# Patient Record
Sex: FEMALE | Race: OTHER | Employment: FULL TIME | URBAN - METROPOLITAN AREA
[De-identification: names, ages, dates, MRNs, and addresses within clinical notes are randomized per-mention and may not be internally consistent; named-entity substitution may affect disease eponyms.]

---

## 2019-02-26 ENCOUNTER — HOSPITAL ENCOUNTER (EMERGENCY)
Age: 44
Discharge: HOME | End: 2019-02-26
Attending: EMERGENCY MEDICINE
Payer: COMMERCIAL

## 2019-02-26 VITALS
TEMPERATURE: 98.1 F | RESPIRATION RATE: 20 BRPM | OXYGEN SATURATION: 97 % | SYSTOLIC BLOOD PRESSURE: 103 MMHG | DIASTOLIC BLOOD PRESSURE: 67 MMHG | HEART RATE: 71 BPM

## 2019-02-26 DIAGNOSIS — R11.2 NAUSEA VOMITING AND DIARRHEA: Primary | ICD-10-CM

## 2019-02-26 DIAGNOSIS — R19.7 NAUSEA VOMITING AND DIARRHEA: Primary | ICD-10-CM

## 2019-02-26 LAB
ALBUMIN SERPL-MCNC: 4.1 G/DL (ref 3.4–5)
ALP BLD-CCNC: 85 U/L (ref 40–129)
ALT SERPL-CCNC: 21 U/L (ref 10–40)
ANION GAP SERPL CALCULATED.3IONS-SCNC: 12 MMOL/L (ref 3–16)
AST SERPL-CCNC: 25 U/L (ref 15–37)
BASOPHILS ABSOLUTE: 0 K/UL (ref 0–0.2)
BASOPHILS RELATIVE PERCENT: 0.1 %
BILIRUB SERPL-MCNC: 0.5 MG/DL (ref 0–1)
BILIRUBIN DIRECT: <0.2 MG/DL (ref 0–0.3)
BILIRUBIN URINE: NEGATIVE
BILIRUBIN, INDIRECT: NORMAL MG/DL (ref 0–1)
BLOOD, URINE: NEGATIVE
BUN BLDV-MCNC: 9 MG/DL (ref 7–20)
CALCIUM SERPL-MCNC: 9 MG/DL (ref 8.3–10.6)
CHLORIDE BLD-SCNC: 106 MMOL/L (ref 99–110)
CLARITY: CLEAR
CO2: 20 MMOL/L (ref 21–32)
COLOR: YELLOW
CREAT SERPL-MCNC: 0.6 MG/DL (ref 0.6–1.1)
EOSINOPHILS ABSOLUTE: 0.1 K/UL (ref 0–0.6)
EOSINOPHILS RELATIVE PERCENT: 2.2 %
GFR AFRICAN AMERICAN: >60
GFR NON-AFRICAN AMERICAN: >60
GLUCOSE BLD-MCNC: 98 MG/DL (ref 70–99)
GLUCOSE URINE: NEGATIVE MG/DL
HCG(URINE) PREGNANCY TEST: NEGATIVE
HCT VFR BLD CALC: 38.9 % (ref 36–48)
HEMOGLOBIN: 13 G/DL (ref 12–16)
KETONES, URINE: NEGATIVE MG/DL
LEUKOCYTE ESTERASE, URINE: NEGATIVE
LIPASE: 36 U/L (ref 13–60)
LYMPHOCYTES ABSOLUTE: 0.8 K/UL (ref 1–5.1)
LYMPHOCYTES RELATIVE PERCENT: 16.2 %
MCH RBC QN AUTO: 27.6 PG (ref 26–34)
MCHC RBC AUTO-ENTMCNC: 33.5 G/DL (ref 31–36)
MCV RBC AUTO: 82.4 FL (ref 80–100)
MICROSCOPIC EXAMINATION: NORMAL
MONOCYTES ABSOLUTE: 0.7 K/UL (ref 0–1.3)
MONOCYTES RELATIVE PERCENT: 14 %
NEUTROPHILS ABSOLUTE: 3.2 K/UL (ref 1.7–7.7)
NEUTROPHILS RELATIVE PERCENT: 67.5 %
NITRITE, URINE: NEGATIVE
PDW BLD-RTO: 14.3 % (ref 12.4–15.4)
PH UA: 6
PLATELET # BLD: 225 K/UL (ref 135–450)
PMV BLD AUTO: 8 FL (ref 5–10.5)
POTASSIUM REFLEX MAGNESIUM: 4 MMOL/L (ref 3.5–5.1)
PROTEIN UA: NEGATIVE MG/DL
RBC # BLD: 4.72 M/UL (ref 4–5.2)
SODIUM BLD-SCNC: 138 MMOL/L (ref 136–145)
SPECIFIC GRAVITY UA: 1.02
TOTAL PROTEIN: 8.1 G/DL (ref 6.4–8.2)
URINE TYPE: NORMAL
UROBILINOGEN, URINE: 0.2 E.U./DL
WBC # BLD: 4.8 K/UL (ref 4–11)

## 2019-02-26 PROCEDURE — 80048 BASIC METABOLIC PNL TOTAL CA: CPT

## 2019-02-26 PROCEDURE — 84703 CHORIONIC GONADOTROPIN ASSAY: CPT

## 2019-02-26 PROCEDURE — 2580000003 HC RX 258: Performed by: STUDENT IN AN ORGANIZED HEALTH CARE EDUCATION/TRAINING PROGRAM

## 2019-02-26 PROCEDURE — 85025 COMPLETE CBC W/AUTO DIFF WBC: CPT

## 2019-02-26 PROCEDURE — 80076 HEPATIC FUNCTION PANEL: CPT

## 2019-02-26 PROCEDURE — 6360000002 HC RX W HCPCS: Performed by: STUDENT IN AN ORGANIZED HEALTH CARE EDUCATION/TRAINING PROGRAM

## 2019-02-26 PROCEDURE — 81003 URINALYSIS AUTO W/O SCOPE: CPT

## 2019-02-26 PROCEDURE — 99284 EMERGENCY DEPT VISIT MOD MDM: CPT

## 2019-02-26 PROCEDURE — 83690 ASSAY OF LIPASE: CPT

## 2019-02-26 PROCEDURE — 96375 TX/PRO/DX INJ NEW DRUG ADDON: CPT

## 2019-02-26 PROCEDURE — 96365 THER/PROPH/DIAG IV INF INIT: CPT

## 2019-02-26 RX ORDER — SODIUM CHLORIDE, SODIUM LACTATE, POTASSIUM CHLORIDE, CALCIUM CHLORIDE 600; 310; 30; 20 MG/100ML; MG/100ML; MG/100ML; MG/100ML
1000 INJECTION, SOLUTION INTRAVENOUS ONCE
Status: COMPLETED | OUTPATIENT
Start: 2019-02-26 | End: 2019-02-26

## 2019-02-26 RX ORDER — ONDANSETRON 2 MG/ML
8 INJECTION INTRAMUSCULAR; INTRAVENOUS ONCE
Status: COMPLETED | OUTPATIENT
Start: 2019-02-26 | End: 2019-02-26

## 2019-02-26 RX ORDER — ONDANSETRON 4 MG/1
8 TABLET, ORALLY DISINTEGRATING ORAL EVERY 8 HOURS PRN
Qty: 9 TABLET | Refills: 0 | Status: SHIPPED | OUTPATIENT
Start: 2019-02-26 | End: 2019-03-01

## 2019-02-26 RX ADMIN — SODIUM CHLORIDE, POTASSIUM CHLORIDE, SODIUM LACTATE AND CALCIUM CHLORIDE 1000 ML: 600; 310; 30; 20 INJECTION, SOLUTION INTRAVENOUS at 10:19

## 2019-02-26 RX ADMIN — ONDANSETRON 8 MG: 2 INJECTION INTRAMUSCULAR; INTRAVENOUS at 10:19

## 2019-02-26 ASSESSMENT — ENCOUNTER SYMPTOMS
EYE ITCHING: 0
COUGH: 0
ABDOMINAL PAIN: 0
NAUSEA: 1
CONSTIPATION: 0
RHINORRHEA: 0
DIARRHEA: 1
WHEEZING: 0
BLOOD IN STOOL: 0
SHORTNESS OF BREATH: 0
SORE THROAT: 0
VOMITING: 1
EYE DISCHARGE: 0

## 2019-02-26 ASSESSMENT — PAIN SCALES - GENERAL: PAINLEVEL_OUTOF10: 5

## 2019-02-26 ASSESSMENT — PAIN DESCRIPTION - DESCRIPTORS: DESCRIPTORS: HEADACHE

## 2019-02-26 ASSESSMENT — PAIN DESCRIPTION - FREQUENCY: FREQUENCY: CONTINUOUS

## 2019-04-14 ENCOUNTER — APPOINTMENT (OUTPATIENT)
Dept: CT IMAGING | Age: 44
DRG: 690 | End: 2019-04-14
Payer: COMMERCIAL

## 2019-04-14 ENCOUNTER — HOSPITAL ENCOUNTER (INPATIENT)
Age: 44
LOS: 2 days | Discharge: HOME | DRG: 690 | End: 2019-04-16
Attending: EMERGENCY MEDICINE | Admitting: HOSPITALIST
Payer: COMMERCIAL

## 2019-04-14 DIAGNOSIS — N10 ACUTE PYELONEPHRITIS: Primary | ICD-10-CM

## 2019-04-14 PROBLEM — N12 PYELONEPHRITIS: Status: ACTIVE | Noted: 2019-04-14

## 2019-04-14 LAB
A/G RATIO: 0.9 (ref 1.1–2.2)
ALBUMIN SERPL-MCNC: 3.8 G/DL (ref 3.4–5)
ALP BLD-CCNC: 88 U/L (ref 40–129)
ALT SERPL-CCNC: 25 U/L (ref 10–40)
ANION GAP SERPL CALCULATED.3IONS-SCNC: 10 MMOL/L (ref 3–16)
AST SERPL-CCNC: 26 U/L (ref 15–37)
BACTERIA: ABNORMAL /HPF
BASE EXCESS VENOUS: -3 (ref -3–3)
BASOPHILS ABSOLUTE: 0 K/UL (ref 0–0.2)
BASOPHILS RELATIVE PERCENT: 0.2 %
BILIRUB SERPL-MCNC: 0.5 MG/DL (ref 0–1)
BILIRUBIN URINE: NEGATIVE
BLOOD, URINE: ABNORMAL
BUN BLDV-MCNC: 9 MG/DL (ref 7–20)
CALCIUM SERPL-MCNC: 9.1 MG/DL (ref 8.3–10.6)
CHLORIDE BLD-SCNC: 101 MMOL/L (ref 99–110)
CLARITY: CLEAR
CO2: 23 MMOL/L (ref 21–32)
COLOR: YELLOW
CREAT SERPL-MCNC: 0.6 MG/DL (ref 0.6–1.1)
EOSINOPHILS ABSOLUTE: 0.1 K/UL (ref 0–0.6)
EOSINOPHILS RELATIVE PERCENT: 1.3 %
EPITHELIAL CELLS, UA: ABNORMAL /HPF
GFR AFRICAN AMERICAN: >60
GFR NON-AFRICAN AMERICAN: >60
GLOBULIN: 4.3 G/DL
GLUCOSE BLD-MCNC: 97 MG/DL (ref 70–99)
GLUCOSE URINE: NEGATIVE MG/DL
HCG(URINE) PREGNANCY TEST: NEGATIVE
HCO3 VENOUS: 21.9 MMOL/L (ref 23–29)
HCT VFR BLD CALC: 36.9 % (ref 36–48)
HEMOGLOBIN: 12 G/DL (ref 12–16)
KETONES, URINE: NEGATIVE MG/DL
LACTIC ACID, SEPSIS: 0.8 MMOL/L (ref 0.4–1.9)
LACTIC ACID, SEPSIS: 0.9 MMOL/L (ref 0.4–1.9)
LEUKOCYTE ESTERASE, URINE: ABNORMAL
LIPASE: 33 U/L (ref 13–60)
LYMPHOCYTES ABSOLUTE: 0.7 K/UL (ref 1–5.1)
LYMPHOCYTES RELATIVE PERCENT: 9.4 %
MCH RBC QN AUTO: 26.9 PG (ref 26–34)
MCHC RBC AUTO-ENTMCNC: 32.6 G/DL (ref 31–36)
MCV RBC AUTO: 82.7 FL (ref 80–100)
MICROSCOPIC EXAMINATION: YES
MONOCYTES ABSOLUTE: 0.8 K/UL (ref 0–1.3)
MONOCYTES RELATIVE PERCENT: 10.7 %
NEUTROPHILS ABSOLUTE: 5.8 K/UL (ref 1.7–7.7)
NEUTROPHILS RELATIVE PERCENT: 78.4 %
NITRITE, URINE: POSITIVE
O2 SAT, VEN: 38 %
PCO2, VEN: 34.8 MM HG (ref 40–50)
PDW BLD-RTO: 14.3 % (ref 12.4–15.4)
PERFORMED ON: ABNORMAL
PH UA: 7 (ref 5–8)
PH VENOUS: 7.41 (ref 7.35–7.45)
PLATELET # BLD: 222 K/UL (ref 135–450)
PMV BLD AUTO: 7.9 FL (ref 5–10.5)
PO2, VEN: 22 MM HG
POC SAMPLE TYPE: ABNORMAL
POTASSIUM REFLEX MAGNESIUM: 4.1 MMOL/L (ref 3.5–5.1)
PROTEIN UA: NEGATIVE MG/DL
RBC # BLD: 4.46 M/UL (ref 4–5.2)
RBC UA: ABNORMAL /HPF (ref 0–2)
SODIUM BLD-SCNC: 134 MMOL/L (ref 136–145)
SPECIFIC GRAVITY UA: 1.01 (ref 1–1.03)
TCO2 CALC VENOUS: 23 MMOL/L
TOTAL PROTEIN: 8.1 G/DL (ref 6.4–8.2)
URINE TYPE: ABNORMAL
UROBILINOGEN, URINE: 1 E.U./DL
WBC # BLD: 7.4 K/UL (ref 4–11)
WBC UA: ABNORMAL /HPF (ref 0–5)

## 2019-04-14 PROCEDURE — 6360000002 HC RX W HCPCS: Performed by: EMERGENCY MEDICINE

## 2019-04-14 PROCEDURE — 6370000000 HC RX 637 (ALT 250 FOR IP): Performed by: STUDENT IN AN ORGANIZED HEALTH CARE EDUCATION/TRAINING PROGRAM

## 2019-04-14 PROCEDURE — 87086 URINE CULTURE/COLONY COUNT: CPT

## 2019-04-14 PROCEDURE — 6370000000 HC RX 637 (ALT 250 FOR IP): Performed by: FAMILY MEDICINE

## 2019-04-14 PROCEDURE — 96375 TX/PRO/DX INJ NEW DRUG ADDON: CPT

## 2019-04-14 PROCEDURE — 82803 BLOOD GASES ANY COMBINATION: CPT

## 2019-04-14 PROCEDURE — 85025 COMPLETE CBC W/AUTO DIFF WBC: CPT

## 2019-04-14 PROCEDURE — 2580000003 HC RX 258: Performed by: EMERGENCY MEDICINE

## 2019-04-14 PROCEDURE — 6360000002 HC RX W HCPCS: Performed by: STUDENT IN AN ORGANIZED HEALTH CARE EDUCATION/TRAINING PROGRAM

## 2019-04-14 PROCEDURE — 87077 CULTURE AEROBIC IDENTIFY: CPT

## 2019-04-14 PROCEDURE — 74177 CT ABD & PELVIS W/CONTRAST: CPT

## 2019-04-14 PROCEDURE — 2580000003 HC RX 258: Performed by: HOSPITALIST

## 2019-04-14 PROCEDURE — G0378 HOSPITAL OBSERVATION PER HR: HCPCS

## 2019-04-14 PROCEDURE — 96372 THER/PROPH/DIAG INJ SC/IM: CPT

## 2019-04-14 PROCEDURE — 1200000000 HC SEMI PRIVATE

## 2019-04-14 PROCEDURE — 6360000002 HC RX W HCPCS: Performed by: HOSPITALIST

## 2019-04-14 PROCEDURE — 99285 EMERGENCY DEPT VISIT HI MDM: CPT

## 2019-04-14 PROCEDURE — 83605 ASSAY OF LACTIC ACID: CPT

## 2019-04-14 PROCEDURE — 83690 ASSAY OF LIPASE: CPT

## 2019-04-14 PROCEDURE — 87040 BLOOD CULTURE FOR BACTERIA: CPT

## 2019-04-14 PROCEDURE — 87186 SC STD MICRODIL/AGAR DIL: CPT

## 2019-04-14 PROCEDURE — C9113 INJ PANTOPRAZOLE SODIUM, VIA: HCPCS | Performed by: HOSPITALIST

## 2019-04-14 PROCEDURE — 96374 THER/PROPH/DIAG INJ IV PUSH: CPT

## 2019-04-14 PROCEDURE — 36415 COLL VENOUS BLD VENIPUNCTURE: CPT

## 2019-04-14 PROCEDURE — 80053 COMPREHEN METABOLIC PANEL: CPT

## 2019-04-14 PROCEDURE — 81001 URINALYSIS AUTO W/SCOPE: CPT

## 2019-04-14 PROCEDURE — 2580000003 HC RX 258: Performed by: STUDENT IN AN ORGANIZED HEALTH CARE EDUCATION/TRAINING PROGRAM

## 2019-04-14 PROCEDURE — 6360000004 HC RX CONTRAST MEDICATION: Performed by: EMERGENCY MEDICINE

## 2019-04-14 PROCEDURE — 84703 CHORIONIC GONADOTROPIN ASSAY: CPT

## 2019-04-14 RX ORDER — ACETAMINOPHEN 325 MG/1
650 TABLET ORAL ONCE
Status: COMPLETED | OUTPATIENT
Start: 2019-04-14 | End: 2019-04-14

## 2019-04-14 RX ORDER — POTASSIUM CHLORIDE 20 MEQ/1
40 TABLET, EXTENDED RELEASE ORAL PRN
Status: DISCONTINUED | OUTPATIENT
Start: 2019-04-14 | End: 2019-04-16 | Stop reason: HOSPADM

## 2019-04-14 RX ORDER — KETOROLAC TROMETHAMINE 30 MG/ML
15 INJECTION, SOLUTION INTRAMUSCULAR; INTRAVENOUS ONCE
Status: COMPLETED | OUTPATIENT
Start: 2019-04-14 | End: 2019-04-14

## 2019-04-14 RX ORDER — SODIUM CHLORIDE 0.9 % (FLUSH) 0.9 %
10 SYRINGE (ML) INJECTION PRN
Status: DISCONTINUED | OUTPATIENT
Start: 2019-04-14 | End: 2019-04-16 | Stop reason: HOSPADM

## 2019-04-14 RX ORDER — ACETAMINOPHEN 325 MG/1
650 TABLET ORAL EVERY 6 HOURS PRN
Status: DISCONTINUED | OUTPATIENT
Start: 2019-04-14 | End: 2019-04-16 | Stop reason: HOSPADM

## 2019-04-14 RX ORDER — 0.9 % SODIUM CHLORIDE 0.9 %
30 INTRAVENOUS SOLUTION INTRAVENOUS ONCE
Status: COMPLETED | OUTPATIENT
Start: 2019-04-14 | End: 2019-04-14

## 2019-04-14 RX ORDER — MORPHINE SULFATE 2 MG/ML
2 INJECTION, SOLUTION INTRAMUSCULAR; INTRAVENOUS EVERY 4 HOURS PRN
Status: DISCONTINUED | OUTPATIENT
Start: 2019-04-14 | End: 2019-04-16 | Stop reason: HOSPADM

## 2019-04-14 RX ORDER — SODIUM CHLORIDE 0.9 % (FLUSH) 0.9 %
10 SYRINGE (ML) INJECTION EVERY 12 HOURS SCHEDULED
Status: DISCONTINUED | OUTPATIENT
Start: 2019-04-14 | End: 2019-04-16 | Stop reason: HOSPADM

## 2019-04-14 RX ORDER — SODIUM CHLORIDE 0.9 % (FLUSH) 0.9 %
10 SYRINGE (ML) INJECTION PRN
Status: DISCONTINUED | OUTPATIENT
Start: 2019-04-14 | End: 2019-04-14 | Stop reason: SDUPTHER

## 2019-04-14 RX ORDER — POTASSIUM CHLORIDE 7.45 MG/ML
10 INJECTION INTRAVENOUS PRN
Status: DISCONTINUED | OUTPATIENT
Start: 2019-04-14 | End: 2019-04-16 | Stop reason: HOSPADM

## 2019-04-14 RX ORDER — SODIUM CHLORIDE 9 MG/ML
INJECTION, SOLUTION INTRAVENOUS CONTINUOUS
Status: DISCONTINUED | OUTPATIENT
Start: 2019-04-14 | End: 2019-04-16 | Stop reason: HOSPADM

## 2019-04-14 RX ORDER — SODIUM CHLORIDE 0.9 % (FLUSH) 0.9 %
10 SYRINGE (ML) INJECTION EVERY 12 HOURS SCHEDULED
Status: DISCONTINUED | OUTPATIENT
Start: 2019-04-14 | End: 2019-04-14 | Stop reason: SDUPTHER

## 2019-04-14 RX ORDER — PANTOPRAZOLE SODIUM 40 MG/10ML
40 INJECTION, POWDER, LYOPHILIZED, FOR SOLUTION INTRAVENOUS DAILY
Status: DISCONTINUED | OUTPATIENT
Start: 2019-04-14 | End: 2019-04-16 | Stop reason: HOSPADM

## 2019-04-14 RX ORDER — ONDANSETRON 2 MG/ML
4 INJECTION INTRAMUSCULAR; INTRAVENOUS EVERY 6 HOURS PRN
Status: DISCONTINUED | OUTPATIENT
Start: 2019-04-14 | End: 2019-04-16 | Stop reason: HOSPADM

## 2019-04-14 RX ORDER — ACETAMINOPHEN 650 MG/1
650 SUPPOSITORY RECTAL EVERY 4 HOURS PRN
Status: DISCONTINUED | OUTPATIENT
Start: 2019-04-14 | End: 2019-04-16 | Stop reason: HOSPADM

## 2019-04-14 RX ADMIN — IOPAMIDOL 80 ML: 755 INJECTION, SOLUTION INTRAVENOUS at 16:11

## 2019-04-14 RX ADMIN — CEFTRIAXONE 1 G: 1 INJECTION, POWDER, FOR SOLUTION INTRAMUSCULAR; INTRAVENOUS at 16:31

## 2019-04-14 RX ADMIN — ENOXAPARIN SODIUM 40 MG: 40 INJECTION SUBCUTANEOUS at 21:31

## 2019-04-14 RX ADMIN — SODIUM CHLORIDE 2217 ML: 9 INJECTION, SOLUTION INTRAVENOUS at 15:12

## 2019-04-14 RX ADMIN — Medication 10 ML: at 21:31

## 2019-04-14 RX ADMIN — KETOROLAC TROMETHAMINE 15 MG: 30 INJECTION, SOLUTION INTRAMUSCULAR at 15:36

## 2019-04-14 RX ADMIN — PANTOPRAZOLE SODIUM 40 MG: 40 INJECTION, POWDER, FOR SOLUTION INTRAVENOUS at 21:31

## 2019-04-14 RX ADMIN — ACETAMINOPHEN 650 MG: 325 TABLET ORAL at 16:31

## 2019-04-14 RX ADMIN — ACETAMINOPHEN 650 MG: 325 TABLET ORAL at 21:31

## 2019-04-14 RX ADMIN — SODIUM CHLORIDE: 900 INJECTION, SOLUTION INTRAVENOUS at 19:26

## 2019-04-14 ASSESSMENT — PAIN DESCRIPTION - ONSET: ONSET: ON-GOING

## 2019-04-14 ASSESSMENT — ENCOUNTER SYMPTOMS
EYE PAIN: 0
VOMITING: 0
COUGH: 0
TROUBLE SWALLOWING: 0
DIARRHEA: 0
ABDOMINAL PAIN: 0
SHORTNESS OF BREATH: 0
BLOOD IN STOOL: 0
NAUSEA: 0

## 2019-04-14 ASSESSMENT — PAIN - FUNCTIONAL ASSESSMENT: PAIN_FUNCTIONAL_ASSESSMENT: ACTIVITIES ARE NOT PREVENTED

## 2019-04-14 ASSESSMENT — PAIN DESCRIPTION - FREQUENCY: FREQUENCY: INTERMITTENT

## 2019-04-14 ASSESSMENT — PAIN DESCRIPTION - PAIN TYPE: TYPE: ACUTE PAIN

## 2019-04-14 ASSESSMENT — PAIN DESCRIPTION - LOCATION: LOCATION: FLANK

## 2019-04-14 ASSESSMENT — PAIN DESCRIPTION - PROGRESSION: CLINICAL_PROGRESSION: NOT CHANGED

## 2019-04-14 ASSESSMENT — PAIN SCALES - GENERAL
PAINLEVEL_OUTOF10: 0
PAINLEVEL_OUTOF10: 6
PAINLEVEL_OUTOF10: 7
PAINLEVEL_OUTOF10: 7

## 2019-04-14 ASSESSMENT — PAIN DESCRIPTION - DESCRIPTORS: DESCRIPTORS: SHARP

## 2019-04-14 ASSESSMENT — PAIN DESCRIPTION - ORIENTATION: ORIENTATION: RIGHT

## 2019-04-14 NOTE — LETTER
[address] Erica Pretty[address] [facility] [address]  Phone: [phone]             April 16, 2019    Patient: Hans Tobin   YOB: 1975   Date of Visit: 4/14/2019       To Whom It May Concern:    Hans Tobin was seen and treated in our facility  beginning 4/14/2019 until 4/16/2019 . She may return to work on 4/18/2019. Emma George       Sincerely,       Barbie Guzman RN         Signature:__________________________________

## 2019-04-14 NOTE — LETTER
[address] [address] [place] [address]  Phone: [phone]             April 16, 2019    Patient: Corrinne Brakeman   YOB: 1975   Date of Visit: 4/14/2019       To Whom It May Concern:    Corrinne Brakeman was seen and treated in our facility  beginning 4/14/2019 until 4/16/2019. She may return to work on 4/18/2019.       Sincerely,       Milind Vogel RN         Signature:__________________________________

## 2019-04-14 NOTE — ED NOTES
[address] [facility] RESIDENT NOTE       Date of evaluation: 4/14/2019    Chief Complaint     Fever and Back Pain      History of Present Illness     Wei Nice is a 44 y.o. female who presents with 3 days of fever and right flank pain. Patient states she has been having fevers as high as 102 for the past 2 days. She also endorses right sided flank pain. Patient also complained of very mild neck pain and headache. She denies nausea, vomiting. She does endorse some dysuria over the same time period, however denies hematuria. History of nephrolithiasis and GERD, otherwise no pertinent medical or surgical history. Review of Systems     Review of Systems   Constitutional: Positive for chills and fever. HENT: Negative for trouble swallowing. Eyes: Negative for pain and visual disturbance. Respiratory: Negative for cough and shortness of breath. Cardiovascular: Negative for chest pain and leg swelling. Gastrointestinal: Negative for abdominal pain, blood in stool, diarrhea, nausea and vomiting. Genitourinary: Positive for dysuria and flank pain. Negative for difficulty urinating, menstrual problem and vaginal discharge. Musculoskeletal: Positive for neck pain. Negative for gait problem and neck stiffness. Skin: Negative for pallor, rash and wound. Allergic/Immunologic: Negative for immunocompromised state. Neurological: Negative for seizures and syncope. Hematological: Does not bruise/bleed easily. Psychiatric/Behavioral: Negative for agitation and behavioral problems. Past Medical, Surgical, Family, and Social History     She has a past medical history of Acid reflux and Gallstones. She has a past surgical history that includes Cholecystectomy. Her family history is not on file. She reports that she has never smoked. She has never used smokeless tobacco. She reports that she drinks alcohol.  She reports that she does not use drugs.    Medications     There are no discharge medications for this patient. Allergies     She has No Known Allergies. Physical Exam     INITIAL VITALS: BP: 121/82, Temp: 101 °F (38.3 °C), Pulse: 104, Resp: 16, SpO2: 100 %   Physical Exam   Constitutional: She appears well-developed and well-nourished. No distress. HENT:   Head: Normocephalic. Mouth/Throat: No oropharyngeal exudate. Eyes: Pupils are equal, round, and reactive to light. Right eye exhibits no discharge. Left eye exhibits no discharge. No scleral icterus. Neck: Normal range of motion. No JVD present. No tracheal deviation present. There is no evidence of meningismus. There is full range of motion without pain. There is no midline tenderness to palpation over the cervical spine. There are no signs of trauma otherwise. Cardiovascular: Regular rhythm. Exam reveals no friction rub. No murmur heard. Pulmonary/Chest: Effort normal. No stridor. No respiratory distress. She has no wheezes. She has no rales. Abdominal: Soft. She exhibits no distension. There is no tenderness. There is no rebound and no guarding. +right-sided flank pain   Musculoskeletal: Normal range of motion. She exhibits no edema. Neurological: No cranial nerve deficit. Skin: Skin is warm and dry. Capillary refill takes less than 2 seconds. She is not diaphoretic. Psychiatric: She has a normal mood and affect. Her behavior is normal.   Nursing note and vitals reviewed. DiagnosticResults     EKG       RADIOLOGY:  CT ABDOMEN PELVIS W IV CONTRAST Additional Contrast? None   Final Result      1. Abnormal right kidney with delayed nephrogram and a couple areas of ill-defined hypoattenuation in the parenchyma with mild surrounding inflammatory change. The findings are suspicious for pyelonephritis. Correlate clinically with physical exam and    urinalysis. 2. Punctate bilateral nephrolithiasis. 3. Normal appendix.              LABS:   Results for orders placed or performed during the hospital encounter of 04/14/19   Lactate, Sepsis   Result Value Ref Range    Lactic Acid, Sepsis 0.9 0.4 - 1.9 mmol/L   CBC auto differential   Result Value Ref Range    WBC 7.4 4.0 - 11.0 K/uL    RBC 4.46 4.00 - 5.20 M/uL    Hemoglobin 12.0 12.0 - 16.0 g/dL    Hematocrit 36.9 36.0 - 48.0 %    MCV 82.7 80.0 - 100.0 fL    MCH 26.9 26.0 - 34.0 pg    MCHC 32.6 31.0 - 36.0 g/dL    RDW 14.3 12.4 - 15.4 %    Platelets 362 980 - 248 K/uL    MPV 7.9 5.0 - 10.5 fL    Neutrophils % 78.4 %    Lymphocytes % 9.4 %    Monocytes % 10.7 %    Eosinophils % 1.3 %    Basophils % 0.2 %    Neutrophils # 5.8 1.7 - 7.7 K/uL    Lymphocytes # 0.7 (L) 1.0 - 5.1 K/uL    Monocytes # 0.8 0.0 - 1.3 K/uL    Eosinophils # 0.1 0.0 - 0.6 K/uL    Basophils # 0.0 0.0 - 0.2 K/uL   Comprehensive Metabolic Panel w/ Reflex to MG   Result Value Ref Range    Sodium 134 (L) 136 - 145 mmol/L    Potassium reflex Magnesium 4.1 3.5 - 5.1 mmol/L    Chloride 101 99 - 110 mmol/L    CO2 23 21 - 32 mmol/L    Anion Gap 10 3 - 16    Glucose 97 70 - 99 mg/dL    BUN 9 7 - 20 mg/dL    CREATININE 0.6 0.6 - 1.1 mg/dL    GFR Non-African American >60 >60    GFR African American >60 >60    Calcium 9.1 8.3 - 10.6 mg/dL    Total Protein 8.1 6.4 - 8.2 g/dL    Alb 3.8 3.4 - 5.0 g/dL    Albumin/Globulin Ratio 0.9 (L) 1.1 - 2.2    Total Bilirubin 0.5 0.0 - 1.0 mg/dL    Alkaline Phosphatase 88 40 - 129 U/L    ALT 25 10 - 40 U/L    AST 26 15 - 37 U/L    Globulin 4.3 g/dL   Urinalysis, reflex to microscopic   Result Value Ref Range    Color, UA Yellow Straw/Yellow    Clarity, UA Clear Clear    Glucose, Ur Negative Negative mg/dL    Bilirubin Urine Negative Negative    Ketones, Urine Negative Negative mg/dL    Specific Gravity, UA 1.015 1.005 - 1.030    Blood, Urine MODERATE (A) Negative    pH, UA 7.0 5.0 - 8.0    Protein, UA Negative Negative mg/dL    Urobilinogen, Urine 1.0 <2.0 E.U./dL    Nitrite, Urine POSITIVE (A) Negative    Leukocyte Esterase, Urine MODERATE (A) Negative    Microscopic Examination YES     Urine Type Voided    Pregnancy, Urine   Result Value Ref Range    HCG(Urine) Pregnancy Test Negative Detects HCG level >20 MIU/mL   Lipase   Result Value Ref Range    Lipase 33.0 13.0 - 60.0 U/L   Microscopic Urinalysis   Result Value Ref Range    WBC, UA 10-20 (A) 0 - 5 /HPF    RBC, UA 0-2 0 - 2 /HPF    Epi Cells 3-5 /HPF    Bacteria, UA 2+ (A) /HPF   POCT Venous   Result Value Ref Range    pH, Eriberto 7.408 7.350 - 7.450    pCO2, Eriberto 34.8 (L) 40.0 - 50.0 mm Hg    pO2, Eriberto 22 Not Established mm Hg    HCO3, Venous 21.9 (L) 23.0 - 29.0 mmol/L    Base Excess, Eriberto -3 -3 - 3    O2 Sat, Eriberto 38 Not Established %    TC02 (Calc), Eriberto 23 Not Established mmol/L    Sample Type ERIBERTO     Performed on SEE BELOW          RECENT VITALS:  BP: 114/73, Temp: 98.5 °F (36.9 °C), Pulse: 100,Resp: 18, SpO2: 98 %     Procedures     ED Course     Nursing Notes, Past Medical Hx, Past Surgical Hx, Social Hx, Allergies, and Family Hx were reviewed.     The patient was given the followingmedications:  Orders Placed This Encounter   Medications    sodium chloride flush 0.9 % injection 10 mL    sodium chloride flush 0.9 % injection 10 mL    0.9 % sodium chloride IV bolus 2,217 mL    ketorolac (TORADOL) injection 15 mg    acetaminophen (TYLENOL) tablet 650 mg    cefTRIAXone (ROCEPHIN) 1 g IVPB in 50 mL D5W minibag    iopamidol (ISOVUE-370) 76 % injection 80 mL    sodium chloride flush 0.9 % injection 10 mL    sodium chloride flush 0.9 % injection 10 mL    magnesium hydroxide (MILK OF MAGNESIA) 400 MG/5ML suspension 30 mL    ondansetron (ZOFRAN) injection 4 mg    enoxaparin (LOVENOX) injection 40 mg    OR Linked Order Group     potassium chloride (KLOR-CON M) extended release tablet 40 mEq     potassium bicarb-citric acid (EFFER-K) effervescent tablet 40 mEq     potassium chloride 10 mEq/100 mL IVPB (Peripheral Line)    acetaminophen (TYLENOL) suppository 650 mg    0.9 % sodium chloride infusion    pantoprazole (PROTONIX) injection 40 mg    cefTRIAXone (ROCEPHIN) 1 g IVPB in 50 mL D5W minibag    morphine (PF) injection 2 mg       CONSULTS:  IP CONSULT TO HOSPITALIST  IP CONSULT TO UROLOGY  IP CONSULT TO [address] [place] [address] / ASSESSMENT / Luiz Nino is a 44 y.o. female history of nephrolithiasis presented with right-sided flank pain and fever, dysuria. Patient has history and exam as above. She is a history of nephrolithiasis. She was febrile on my exam with right-sided CVA tenderness. She also reported some neck pain, however had absolutely no meningismus on my exam, no altered sensorium. Patient has been having symptoms for 3 days. Her urine had signs of infection. There was concern for intra-abdominal pathology, so CT abdomen and pelvis with contrast was obtained and concerning for pyelonephritis on the right with punctate renal calculi bilaterally. Urology was consulted and recommended no urgent surgical intervention. Patient was given ceftriaxone and fluids, Tylenol for comfort. She was admitted to medicine for intravenous antibiotics. Patient agreed with the plan and verbalized understanding. This patient was also evaluated by the attending physician. All care plans werediscussed and agreed upon. Clinical Impression     1. Acute pyelonephritis        Disposition     PATIENT REFERRED TO:  Demetrius Mccurdy MD  [address] [facility],[address]  [facility]  [phone]            DISCHARGE MEDICATIONS:  There are no discharge medications for this patient.       DISPOSITION Admitted 04/14/2019 06:05:51 PM       Jennifer Ruffin MD PhD  Resident  04/14/19 8519

## 2019-04-14 NOTE — H&P
Hospital Medicine History & Physical      PCP: Junior Washington MD    Date of Admission: 4/14/2019    Date of Service: Pt seen/examined on 4/14/19 and Admitted to Inpatient with expected LOS greater than two midnights due to medical therapy. *    Chief Complaint:  fever and chills for 3 days and back pain      History Of Present Illness:     44 y.o. female who presented to [facility] with chief complain of fever chills for 3 days denies any chest pain or shortness of breath no cough no sputum no nausea vomiting complaints of back pain right more than left also complains of dysuria denies any hematuria she does not smoke or drink alcohol. Patient with a history of recurrent nephrolithiasis, GERD    Past Medical History:          Diagnosis Date    Acid reflux     Gallstones        Past Surgical History:          Procedure Laterality Date    CHOLECYSTECTOMY         Medications Prior to Admission:      Prior to Admission medications    Not on File       Allergies:  Patient has no known allergies. Social History:      The patient currently lives with her     TOBACCO:   reports that she has never smoked. She has never used smokeless tobacco.  ETOH:   reports that she drinks alcohol. Family History:       Reviewed in detail and negative for DM, CAD, Cancer, CVA. Positive as follows:    History reviewed. No pertinent family history. REVIEW OF SYSTEMS:   Pertinent positives as noted in the HPI. All other systems reviewed and negative. PHYSICAL EXAM PERFORMED:    /74   Pulse 101   Temp 100.2 °F (37.9 °C) (Oral)   Resp 16   Wt 163 lb (73.9 kg)   LMP 04/10/2019   SpO2 100%     General appearance:  No apparent distress, appears stated age and cooperative. ill appearing  HEENT:  Normal cephalic, atraumatic without obvious deformity. Pupils equal, round, and reactive to light. Extra ocular muscles intact. Conjunctivae/corneas clear. Neck: Supple, with full range of motion.  No jugular venous distention. Trachea midline. Respiratory:  Normal respiratory effort. Clear to auscultation, bilaterally without Rales/Wheezes/Rhonchi. Cardiovascular:  Regular rate and rhythm with normal S1/S2 without murmurs, rubs or gallops. Abdomen: Soft, non-tender, non-distended with normal bowel sounds. Musculoskeletal:  No clubbing, cyanosis or edema bilaterally. Full range of motion without deformity. Positive CVA tenderness right more than left  Skin: Skin color, texture, turgor normal.  No rashes or lesions. Neurologic:  Neurovascularly intact without any focal sensory/motor deficits. Cranial nerves: II-XII intact, grossly non-focal.  Psychiatric:  Alert and oriented, thought content appropriate, normal insight  Capillary Refill: Brisk,< 3 seconds   Peripheral Pulses: +2 palpable, equal bilaterally       Labs:     Recent Labs     04/14/19  1500   WBC 7.4   HGB 12.0   HCT 36.9        Recent Labs     04/14/19  1500   *   K 4.1      CO2 23   BUN 9   CREATININE 0.6   CALCIUM 9.1     Recent Labs     04/14/19  1500   AST 26   ALT 25   BILITOT 0.5   ALKPHOS 88     No results for input(s): INR in the last 72 hours. No results for input(s): [employer] in the last 72 hours. Urinalysis:      Lab Results   Component Value Date    NITRU POSITIVE 04/14/2019    WBCUA 10-20 04/14/2019    BACTERIA 2+ 04/14/2019    RBCUA 0-2 04/14/2019    BLOODU MODERATE 04/14/2019    SPECGRAV 1.015 04/14/2019    GLUCOSEU Negative 04/14/2019       Radiology:     CXR: I have reviewed the CXR with the following interpretation: NAD  EKG:  I have reviewed the EKG with the following interpretation:     CT ABDOMEN PELVIS W IV CONTRAST Additional Contrast? None   Final Result      1. Abnormal right kidney with delayed nephrogram and a couple areas of ill-defined hypoattenuation in the parenchyma with mild surrounding inflammatory change. The findings are suspicious for pyelonephritis.  Correlate clinically with physical exam and    urinalysis. 2. Punctate bilateral nephrolithiasis. 3. Normal appendix. ASSESSMENT:    Active Hospital Problems    Diagnosis Date Noted    Pyelonephritis [N12] 04/14/2019   GERD  Obesity      PLAN:  1. This 72-year-old female with recurrent nephrolithiasis, pyelonephritis, will admit patient to MedSurg floor continue with IV antibiotic IV Rocephin follow blood cultures consult urology, check CBC and BMP in a.m. 2.  Quince Devoid continue the PPI. 3.  Weight loss and exercise    DVT Prophylaxis: Lovenox sub Q  Diet: No diet orders on file  Code Status: full code    PT/OT Eval Status:     Akila Macias MD    Thank you Woodrow Nunes MD for the opportunity to be involved in this patient's care. If you have any questions or concerns please feel free to contact me at [phone].

## 2019-04-15 LAB
ANION GAP SERPL CALCULATED.3IONS-SCNC: 10 MMOL/L (ref 3–16)
BUN BLDV-MCNC: 9 MG/DL (ref 7–20)
CALCIUM SERPL-MCNC: 8.1 MG/DL (ref 8.3–10.6)
CHLORIDE BLD-SCNC: 111 MMOL/L (ref 99–110)
CO2: 19 MMOL/L (ref 21–32)
CREAT SERPL-MCNC: <0.5 MG/DL (ref 0.6–1.1)
GFR AFRICAN AMERICAN: >60
GFR NON-AFRICAN AMERICAN: >60
GLUCOSE BLD-MCNC: 91 MG/DL (ref 70–99)
HCT VFR BLD CALC: 32.5 % (ref 36–48)
HEMOGLOBIN: 10.8 G/DL (ref 12–16)
MCH RBC QN AUTO: 27.7 PG (ref 26–34)
MCHC RBC AUTO-ENTMCNC: 33.1 G/DL (ref 31–36)
MCV RBC AUTO: 83.5 FL (ref 80–100)
PDW BLD-RTO: 14.3 % (ref 12.4–15.4)
PLATELET # BLD: 193 K/UL (ref 135–450)
PMV BLD AUTO: 7.9 FL (ref 5–10.5)
POTASSIUM REFLEX MAGNESIUM: 4 MMOL/L (ref 3.5–5.1)
RBC # BLD: 3.89 M/UL (ref 4–5.2)
SODIUM BLD-SCNC: 140 MMOL/L (ref 136–145)
WBC # BLD: 4.9 K/UL (ref 4–11)

## 2019-04-15 PROCEDURE — 96376 TX/PRO/DX INJ SAME DRUG ADON: CPT

## 2019-04-15 PROCEDURE — 96375 TX/PRO/DX INJ NEW DRUG ADDON: CPT

## 2019-04-15 PROCEDURE — 2580000003 HC RX 258: Performed by: HOSPITALIST

## 2019-04-15 PROCEDURE — 36415 COLL VENOUS BLD VENIPUNCTURE: CPT

## 2019-04-15 PROCEDURE — 85027 COMPLETE CBC AUTOMATED: CPT

## 2019-04-15 PROCEDURE — 6370000000 HC RX 637 (ALT 250 FOR IP): Performed by: INTERNAL MEDICINE

## 2019-04-15 PROCEDURE — G0378 HOSPITAL OBSERVATION PER HR: HCPCS

## 2019-04-15 PROCEDURE — 1200000000 HC SEMI PRIVATE

## 2019-04-15 PROCEDURE — 6360000002 HC RX W HCPCS: Performed by: HOSPITALIST

## 2019-04-15 PROCEDURE — 96372 THER/PROPH/DIAG INJ SC/IM: CPT

## 2019-04-15 PROCEDURE — 80048 BASIC METABOLIC PNL TOTAL CA: CPT

## 2019-04-15 PROCEDURE — C9113 INJ PANTOPRAZOLE SODIUM, VIA: HCPCS | Performed by: HOSPITALIST

## 2019-04-15 RX ORDER — IBUPROFEN 400 MG/1
400 TABLET ORAL EVERY 6 HOURS PRN
Status: DISCONTINUED | OUTPATIENT
Start: 2019-04-15 | End: 2019-04-16 | Stop reason: HOSPADM

## 2019-04-15 RX ORDER — ACETAMINOPHEN 325 MG/1
650 TABLET ORAL EVERY 8 HOURS SCHEDULED
Status: DISCONTINUED | OUTPATIENT
Start: 2019-04-15 | End: 2019-04-16 | Stop reason: HOSPADM

## 2019-04-15 RX ADMIN — ACETAMINOPHEN 650 MG: 325 TABLET ORAL at 21:13

## 2019-04-15 RX ADMIN — MORPHINE SULFATE 2 MG: 2 INJECTION, SOLUTION INTRAMUSCULAR; INTRAVENOUS at 06:21

## 2019-04-15 RX ADMIN — ENOXAPARIN SODIUM 40 MG: 40 INJECTION SUBCUTANEOUS at 21:13

## 2019-04-15 RX ADMIN — SODIUM CHLORIDE: 900 INJECTION, SOLUTION INTRAVENOUS at 21:21

## 2019-04-15 RX ADMIN — CEFTRIAXONE 1 G: 1 INJECTION, POWDER, FOR SOLUTION INTRAMUSCULAR; INTRAVENOUS at 16:43

## 2019-04-15 RX ADMIN — SODIUM CHLORIDE: 900 INJECTION, SOLUTION INTRAVENOUS at 14:33

## 2019-04-15 RX ADMIN — ACETAMINOPHEN 650 MG: 325 TABLET ORAL at 14:32

## 2019-04-15 RX ADMIN — PANTOPRAZOLE SODIUM 40 MG: 40 INJECTION, POWDER, FOR SOLUTION INTRAVENOUS at 09:52

## 2019-04-15 ASSESSMENT — PAIN DESCRIPTION - ONSET
ONSET: ON-GOING
ONSET: ON-GOING

## 2019-04-15 ASSESSMENT — PAIN DESCRIPTION - FREQUENCY
FREQUENCY: INTERMITTENT
FREQUENCY: CONTINUOUS

## 2019-04-15 ASSESSMENT — PAIN DESCRIPTION - PROGRESSION
CLINICAL_PROGRESSION: NOT CHANGED
CLINICAL_PROGRESSION: NOT CHANGED

## 2019-04-15 ASSESSMENT — PAIN DESCRIPTION - PAIN TYPE
TYPE: ACUTE PAIN
TYPE: ACUTE PAIN

## 2019-04-15 ASSESSMENT — PAIN SCALES - GENERAL
PAINLEVEL_OUTOF10: 7
PAINLEVEL_OUTOF10: 6
PAINLEVEL_OUTOF10: 4
PAINLEVEL_OUTOF10: 0
PAINLEVEL_OUTOF10: 7

## 2019-04-15 ASSESSMENT — PAIN DESCRIPTION - DESCRIPTORS
DESCRIPTORS: HEADACHE
DESCRIPTORS: SHARP;HEADACHE

## 2019-04-15 ASSESSMENT — PAIN - FUNCTIONAL ASSESSMENT: PAIN_FUNCTIONAL_ASSESSMENT: ACTIVITIES ARE NOT PREVENTED

## 2019-04-15 ASSESSMENT — PAIN DESCRIPTION - ORIENTATION: ORIENTATION: RIGHT

## 2019-04-15 ASSESSMENT — PAIN DESCRIPTION - LOCATION
LOCATION: FLANK;HEAD
LOCATION: HEAD

## 2019-04-15 NOTE — PLAN OF CARE
Problem: Pain:  Goal: Pain level will decrease  Description  Pain level will decrease  4/15/2019 0231 by Cady Romero RN  Note:   Patient complains of pain at level 7/10. Patient describes pain as sharp. Patient requests pain medication. Patient medicated with prn tylenol . Will continue to monitor. Goal: Control of acute pain  Description  Control of acute pain  Note:   She complains of headache pain. No flank pain. Scheduled tylenol given as ordered. Will monitor. Problem: Urinary Elimination:  Goal: Signs and symptoms of infection will decrease  Description  Signs and symptoms of infection will decrease  Note:   No fever. WBC wnl. On antibiotics. Will monitor.

## 2019-04-15 NOTE — PLAN OF CARE
Problem: Pain:  Description  Pain management should include both nonpharmacologic and pharmacologic interventions. Goal: Pain level will decrease  Description  Pain level will decrease  Note:   Patient complains of pain at level 7/10. Patient describes pain as sharp. Patient requests pain medication. Patient medicated with prn tylenol . Will continue to monitor.

## 2019-04-15 NOTE — CARE COORDINATION
facilitating achievement of predicted outcomes: Family support, Caregiver support, Motivated, Cooperative and Pleasant    Barriers to discharge: medical complications     Kaylyn Valadez RN  [facility]  Case Management Department  Ph: [phone]

## 2019-04-16 VITALS
SYSTOLIC BLOOD PRESSURE: 115 MMHG | TEMPERATURE: 98.1 F | HEART RATE: 70 BPM | RESPIRATION RATE: 16 BRPM | DIASTOLIC BLOOD PRESSURE: 67 MMHG | WEIGHT: 167.5 LBS | OXYGEN SATURATION: 99 % | BODY MASS INDEX: 29.68 KG/M2 | HEIGHT: 63 IN

## 2019-04-16 LAB
ORGANISM: ABNORMAL
URINE CULTURE, ROUTINE: ABNORMAL
URINE CULTURE, ROUTINE: ABNORMAL

## 2019-04-16 PROCEDURE — 6370000000 HC RX 637 (ALT 250 FOR IP): Performed by: INTERNAL MEDICINE

## 2019-04-16 PROCEDURE — 96376 TX/PRO/DX INJ SAME DRUG ADON: CPT

## 2019-04-16 PROCEDURE — C9113 INJ PANTOPRAZOLE SODIUM, VIA: HCPCS | Performed by: HOSPITALIST

## 2019-04-16 PROCEDURE — 6360000002 HC RX W HCPCS: Performed by: HOSPITALIST

## 2019-04-16 PROCEDURE — 2580000003 HC RX 258: Performed by: HOSPITALIST

## 2019-04-16 PROCEDURE — G0378 HOSPITAL OBSERVATION PER HR: HCPCS

## 2019-04-16 RX ORDER — CIPROFLOXACIN 500 MG/1
500 TABLET, FILM COATED ORAL 2 TIMES DAILY
Qty: 20 TABLET | Refills: 0 | Status: SHIPPED | OUTPATIENT
Start: 2019-04-16 | End: 2019-04-16 | Stop reason: SDUPTHER

## 2019-04-16 RX ORDER — CIPROFLOXACIN 500 MG/1
500 TABLET, FILM COATED ORAL 2 TIMES DAILY
Qty: 20 TABLET | Refills: 0 | Status: SHIPPED | OUTPATIENT
Start: 2019-04-16 | End: 2019-04-26

## 2019-04-16 RX ORDER — ONDANSETRON 4 MG/1
4 TABLET, FILM COATED ORAL DAILY PRN
Qty: 30 TABLET | Refills: 0 | Status: SHIPPED | OUTPATIENT
Start: 2019-04-16 | End: 2020-06-03 | Stop reason: ALTCHOICE

## 2019-04-16 RX ORDER — ONDANSETRON 4 MG/1
4 TABLET, FILM COATED ORAL DAILY PRN
Qty: 30 TABLET | Refills: 0 | Status: SHIPPED | OUTPATIENT
Start: 2019-04-16 | End: 2019-04-16 | Stop reason: SDUPTHER

## 2019-04-16 RX ADMIN — SODIUM CHLORIDE: 900 INJECTION, SOLUTION INTRAVENOUS at 05:22

## 2019-04-16 RX ADMIN — PANTOPRAZOLE SODIUM 40 MG: 40 INJECTION, POWDER, FOR SOLUTION INTRAVENOUS at 08:52

## 2019-04-16 RX ADMIN — ACETAMINOPHEN 650 MG: 325 TABLET ORAL at 05:22

## 2019-04-16 ASSESSMENT — PAIN DESCRIPTION - DESCRIPTORS
DESCRIPTORS: HEADACHE
DESCRIPTORS: HEADACHE

## 2019-04-16 ASSESSMENT — PAIN DESCRIPTION - PAIN TYPE: TYPE: ACUTE PAIN

## 2019-04-16 ASSESSMENT — PAIN SCALES - GENERAL
PAINLEVEL_OUTOF10: 2
PAINLEVEL_OUTOF10: 0
PAINLEVEL_OUTOF10: 4

## 2019-04-16 ASSESSMENT — PAIN DESCRIPTION - ORIENTATION: ORIENTATION: MID

## 2019-04-16 ASSESSMENT — PAIN DESCRIPTION - ONSET: ONSET: ON-GOING

## 2019-04-16 ASSESSMENT — PAIN - FUNCTIONAL ASSESSMENT: PAIN_FUNCTIONAL_ASSESSMENT: ACTIVITIES ARE NOT PREVENTED

## 2019-04-16 ASSESSMENT — PAIN DESCRIPTION - PROGRESSION: CLINICAL_PROGRESSION: GRADUALLY WORSENING

## 2019-04-16 ASSESSMENT — PAIN DESCRIPTION - FREQUENCY: FREQUENCY: CONTINUOUS

## 2019-04-16 ASSESSMENT — PAIN DESCRIPTION - LOCATION: LOCATION: HEAD

## 2019-04-16 NOTE — PLAN OF CARE
Problem: Pain:  Goal: Control of acute pain  Description  Control of acute pain  Outcome: Ongoing  Note:   Pt still having slight headache. She reports very slight, given scheduled tylenol. Denies any questions. Will continue to monitor comfort and follow up response on pain flowsheet.

## 2019-04-16 NOTE — PROGRESS NOTES
4 Eyes Admission Assessment     I agree as the admission nurse that 2 RN's have performed a thorough Head to Toe Skin Assessment on the patient. ALL assessment sites listed below have been assessed on admission.        Areas assessed by both nurses:   [x]   Head, Face, and Ears   [x]   Shoulders, Back, and Chest  [x]   Arms, Elbows, and Hands   [x]   Coccyx, Sacrum, and Ischum  [x]   Legs, Feet, and Heels        Does the Patient have Skin Breakdown?  no        Nathaniel Prevention initiated:  no   Wound Care Orders initiated:  no      [facility] nurse consulted for Pressure Injury (Stage 3,4, Unstageable, DTI, NWPT, and Complex wounds):  no      Nurse 1 eSignature: Electronically signed by Dolores Spears RN on 4/14/19 at 7:00 PM    **SHARE this note so that the co-signing nurse is able to place an eSignature**    Nurse 2 eSignature: Electronically signed by Ryan Desouza RN on 4/14/19 at 10:29 PM
Patient discharged home with family. IV removed, site unremarkable. Discharge instructions reviewed with patient and family and they verbalize understanding. Instructions printed in both [place] and [place] Taken by wheelchair to car.
Pt arrives to floor alert, oriented and with  at her side. Her vitals are stable and she is afebrile. Assessment as documented. Medications administerd per mAr. Bolus infusing. Plan of care discussed with pt and . Admission complete. Will continue to monitor.
ibuprofen prn. Schedule Tylenol tid for now    Anemia  Check iron levels, vit B12 and folate      D/w patient and , questions answered      DVT Prophylaxis: Lovenox  Diet: DIET GENERAL;  Code Status: Full Code    PT/OT Eval Status: not needed    Dispo - inpatient.  Discharge in  1-2 days     Kevon Kehr, MD

## 2019-04-16 NOTE — CARE COORDINATION
Case Management Discharge Assessment    2019  [facility] Case Management Department    Patient: Marc Hylton  MRN: [id number] / : 1975  ACCT: [de-identified]          Admission Documentation  Attending Provider: Ki Monteiro MD  Admit date/time: 2019  1:22 PM  Status: Inpatient [101]  Diagnosis: Pyelonephritis     Readmission within last 30 days:  no     Living Situation  Discharge Planning  Living Arrangements: Children, Spouse/Significant Other  Support Systems: Children, Spouse/Significant Other, Family Members  Potential Assistance Needed: N/A  Type of Home Care Services: None  Patient expects to be discharged to[de-identified] home  Expected Discharge Date: 19    Service Assessment:       Values / Beliefs  Do you have any ethnic, cultural, sacramental, or spiritual Christianity needs you would like us to be aware of while you are in the hospital?: No    Advance Directives (For Healthcare)  Pre-existing DNR Comfort Care/DNR Arrest/DNI Order: No  Healthcare Directive: No, patient does not have an advance directive for healthcare treatment                        Pharmacy:   [facility]  :  [facility]   Address: [address] [place] [address]  Phone: [phone]      Potential Assistance Purchasing Medications:  No  Does patient want to participate in local refill/meds to beds program?: No    Notification completed in HENS/PAS? No     IMM  No       Discharge disposition: Home  Discharge Event  Discharged with Documented Belongings: Yes  Departure Mode: Family member  Mobility at [employer]: Wheelchair  Discharged to: Private Residence  [address] [place] [address] w/ spouse; Mode of Transport Private car with     Kane Sanches and her family were provided with choice of provider; she and her family are in agreement with the discharge plan. CM spoke with pt and her  who was at bedside this am in rounds and aware of d/c  Later today .   Will need note to return to work  From [facility]  stating when she can return once verfied  By MD  . Patient and  prefer to use  Their own pharmacy for prescription filled.       Care Transitions patient: No    Parminder Jeter RN  [facility]  Case Management Department  Ph: [phone]

## 2019-04-16 NOTE — FLOWSHEET NOTE
04/16/19 0948   Encounter Summary   Services provided to: Patient and family together   Referral/Consult From: [facility]   (Seen 4/16/19, MARI. )   Complexity of Encounter Moderate   Length of Encounter 15 minutes   Routine   Type Initial   Assessment Approachable   Intervention Nurtured hope   Outcome Expressed gratitude

## 2019-04-19 LAB
BLOOD CULTURE, ROUTINE: NORMAL
CULTURE, BLOOD 2: NORMAL

## 2019-05-10 NOTE — DISCHARGE SUMMARY
Hospitalist Discharge Summary     Wei Nice  : 1975  MRN: [id number]    Admit date: 2019  Discharge date: 2019    Admitting Physician: Luis Mccord MD    Discharge Diagnoses:   Patient Active Problem List   Diagnosis    Pyelonephritis       Admission Condition: fair    Discharged Condition: stable    Discharge Exam:  VITALS:  /67   Pulse 70   Temp 98.1 °F (36.7 °C) (Oral)   Resp 16   Ht 5' 3\" (1.6 m)   Wt 167 lb 8 oz (76 kg)   LMP 04/10/2019   SpO2 99%   BMI 29.67 kg/m²   CONSTITUTIONAL:  awake, alert, cooperative, no apparent distress, and appears stated age  EYES:  Lids and lashes normal, PERRL, EOMI, sclera clear, conjunctiva normal  ENT:  NC/AT, MMM    NECK:  Supple, symmetrical, trachea midline, no adenopathy  HEMATOLOGIC/LYMPHATICS:  no cervical, supraclavicular or axillary lymphadenopathy  LUNGS:  clear to auscultation bilaterally, No increased work of breathing, good air exchange, no crackles or wheezing  CARDIOVASCULAR:  Regular rate and rhythm, normal S1 and S2, no S3 or S4, and no significant murmurs, rubs or gallops noted. Normal apical impulse,   ABDOMEN:  Normal active bowel sounds, soft, non-tender, non-distended, no masses palpated, no organomegally  MUSCULOSKELETAL:  Full range of motion noted. NEUROLOGIC:  Awake, alert, oriented to name, place and time. Cranial nerves II-XII are grossly intact. SKIN:  normal skin color, texture, turgor for age. Hospital Course:     Hospital Course: admitted with fever & chills. Diagnosed with pyelonephritis and urolithiasis.  Feels better today, still has some frontal headache     Acute pyelonephritis  Treated with IV antibiotics  Urine culture with E coli, discharged on Cipro  Blood cultures remained negative  Fever has resolved     Urolithiasis  Non-obstructive  Seen by urology  Conservative treatment for now     Headache  Non-specific due to fever and infection  Improved with Ibuprofen and Tylenol    Anemia  Stable      Consults: urology    Imaging Studies:  Ct Abdomen Pelvis W Iv Contrast Additional Contrast? None    Result Date: 4/14/2019  EXAM: CT ABDOMEN PELVIS W IV CONTRAST INDICATION: RLQ PAIN, APPENDICITIS SUSPECT, FEVER, ELEV WBC, CLASSIC PRESENTATION COMPARISON: None TECHNIQUE: Standard per department protocol following the administration of 80 mL Isovue-370 intravenous contrast. Up-to-date CT equipment and radiation dose reduction techniques were employed. FINDINGS: : No additional findings. ABDOMEN/PELVIS: Lung bases: Clear. Mediastinum: Normal. Liver and biliary system: Status post cholecystectomy. The liver appears normal. Pancreas: Normal. Spleen: Normal. Adrenal glands: Normal. Genitourinary: The right kidney has a delayed nephrogram with a few areas of ill-defined hypoattenuation within the renal parenchyma. There is mild surrounding inflammatory fat stranding and fascial thickening. A single punctate calculus is present in both kidneys. No hydronephrosis. The bladder is normal. The uterus and ovaries appear normal. Bowel: The small and large bowel are normal in caliber. No bowel wall thickening. The appendix is normal. Abdominal wall: Normal. Peritoneal cavity: No free or loculated fluid collections are seen. Vasculature: No aneurysm. Lymph nodes: No lymphadenopathy is appreciated. Osseous structures: No suspicious abnormality. 1. Abnormal right kidney with delayed nephrogram and a couple areas of ill-defined hypoattenuation in the parenchyma with mild surrounding inflammatory change. The findings are suspicious for pyelonephritis. Correlate clinically with physical exam and urinalysis. 2. Punctate bilateral nephrolithiasis. 3. Normal appendix.        Other Significant Diagnostic Studies: As described above    Treatments: As described above    Disposition: home    Discharge Medications:     Chacha Burnett 25 Medication Instructions SZS:[id number]    Printed on:05/09/19 5225 Medication Information                      ondansetron (ZOFRAN) 4 MG tablet  Take 1 tablet by mouth daily as needed for Nausea or Vomiting                 35 Minutes spent on patient evaluation, counseling and discharge planning.      Signed:  Hillary Diaz MD  5/9/2019, 10:53 PM

## 2019-06-04 NOTE — ED PROVIDER NOTES
THIS NOTE IS A COPY OF A NOTE ENTERED ON THE DATE OF THE ENCOUNTER ERRONEOUSLY AS AN ED NOTE INSTEAD OF ED PROVIDER NOTE         Signed        Expand All Collapse All            Show:Clear all  [x]Manual[x]Template[]Copied    Added by:  Gerardo Woodson MD PhD      []Constance for details         THE [address] [facility] RESIDENT NOTE         Date of evaluation: 4/14/2019     Chief Complaint      Fever and Back Pain        History of Present Illness      Marc Hylton is a 44 y.o. female who presents with 3 days of fever and right flank pain.     Patient states she has been having fevers as high as 102 for the past 2 days. She also endorses right sided flank pain. Patient also complained of very mild neck pain and headache. She denies nausea, vomiting. She does endorse some dysuria over the same time period, however denies hematuria. History of nephrolithiasis and GERD, otherwise no pertinent medical or surgical history.     Review of Systems      Review of Systems   Constitutional: Positive for chills and fever. HENT: Negative for trouble swallowing. Eyes: Negative for pain and visual disturbance. Respiratory: Negative for cough and shortness of breath. Cardiovascular: Negative for chest pain and leg swelling. Gastrointestinal: Negative for abdominal pain, blood in stool, diarrhea, nausea and vomiting. Genitourinary: Positive for dysuria and flank pain. Negative for difficulty urinating, menstrual problem and vaginal discharge. Musculoskeletal: Positive for neck pain. Negative for gait problem and neck stiffness. Skin: Negative for pallor, rash and wound. Allergic/Immunologic: Negative for immunocompromised state. Neurological: Negative for seizures and syncope. Hematological: Does not bruise/bleed easily.    Psychiatric/Behavioral: Negative for agitation and behavioral problems.         Past Medical, Surgical, Family, and Social History      She nephrogram and a couple areas of ill-defined hypoattenuation in the parenchyma with mild surrounding inflammatory change. The findings are suspicious for pyelonephritis. Correlate clinically with physical exam and    urinalysis. 2. Punctate bilateral nephrolithiasis.    3. Normal appendix.                 LABS:         Results for orders placed or performed during the hospital encounter of 04/14/19   Lactate, Sepsis   Result Value Ref Range     Lactic Acid, Sepsis 0.9 0.4 - 1.9 mmol/L   CBC auto differential   Result Value Ref Range     WBC 7.4 4.0 - 11.0 K/uL     RBC 4.46 4.00 - 5.20 M/uL     Hemoglobin 12.0 12.0 - 16.0 g/dL     Hematocrit 36.9 36.0 - 48.0 %     MCV 82.7 80.0 - 100.0 fL     MCH 26.9 26.0 - 34.0 pg     MCHC 32.6 31.0 - 36.0 g/dL     RDW 14.3 12.4 - 15.4 %     Platelets 280 388 - 188 K/uL     MPV 7.9 5.0 - 10.5 fL     Neutrophils % 78.4 %     Lymphocytes % 9.4 %     Monocytes % 10.7 %     Eosinophils % 1.3 %     Basophils % 0.2 %     Neutrophils # 5.8 1.7 - 7.7 K/uL     Lymphocytes # 0.7 (L) 1.0 - 5.1 K/uL     Monocytes # 0.8 0.0 - 1.3 K/uL     Eosinophils # 0.1 0.0 - 0.6 K/uL     Basophils # 0.0 0.0 - 0.2 K/uL   Comprehensive Metabolic Panel w/ Reflex to MG   Result Value Ref Range     Sodium 134 (L) 136 - 145 mmol/L     Potassium reflex Magnesium 4.1 3.5 - 5.1 mmol/L     Chloride 101 99 - 110 mmol/L     CO2 23 21 - 32 mmol/L     Anion Gap 10 3 - 16     Glucose 97 70 - 99 mg/dL     BUN 9 7 - 20 mg/dL     CREATININE 0.6 0.6 - 1.1 mg/dL     GFR Non- >60 >60     GFR  >60 >60     Calcium 9.1 8.3 - 10.6 mg/dL     Total Protein 8.1 6.4 - 8.2 g/dL     Alb 3.8 3.4 - 5.0 g/dL     Albumin/Globulin Ratio 0.9 (L) 1.1 - 2.2     Total Bilirubin 0.5 0.0 - 1.0 mg/dL     Alkaline Phosphatase 88 40 - 129 U/L     ALT 25 10 - 40 U/L     AST 26 15 - 37 U/L     Globulin 4.3 g/dL   Urinalysis, reflex to microscopic   Result Value Ref Range     Color, UA Yellow Straw/Yellow     Clarity, UA Clear Clear     Glucose, Ur Negative Negative mg/dL     Bilirubin Urine Negative Negative     Ketones, Urine Negative Negative mg/dL     Specific Gravity, UA 1.015 1.005 - 1.030     Blood, Urine MODERATE (A) Negative     pH, UA 7.0 5.0 - 8.0     Protein, UA Negative Negative mg/dL     Urobilinogen, Urine 1.0 <2.0 E.U./dL     Nitrite, Urine POSITIVE (A) Negative     Leukocyte Esterase, Urine MODERATE (A) Negative     Microscopic Examination YES       Urine Type Voided     Pregnancy, Urine   Result Value Ref Range     HCG(Urine) Pregnancy Test Negative Detects HCG level >20 MIU/mL   Lipase   Result Value Ref Range     Lipase 33.0 13.0 - 60.0 U/L   Microscopic Urinalysis   Result Value Ref Range     WBC, UA 10-20 (A) 0 - 5 /HPF     RBC, UA 0-2 0 - 2 /HPF     Epi Cells 3-5 /HPF     Bacteria, UA 2+ (A) /HPF   POCT Venous   Result Value Ref Range     pH, Eriberto 7.408 7.350 - 7.450     pCO2, Eriberto 34.8 (L) 40.0 - 50.0 mm Hg     pO2, Eriberto 22 Not Established mm Hg     HCO3, Venous 21.9 (L) 23.0 - 29.0 mmol/L     Base Excess, Eriberto -3 -3 - 3     O2 Sat, Eriberto 38 Not Established %     TC02 (Calc), Eriberto 23 Not Established mmol/L     Sample Type ERIBERTO       Performed on SEE BELOW              RECENT VITALS:  BP: 114/73, Temp: 98.5 °F (36.9 °C), Pulse: 100,Resp: 18, SpO2: 98 %      Procedures      ED Course      Nursing Notes, Past Medical Hx, Past Surgical Hx, Social Hx, Allergies, and Family Hx were reviewed.     The patient was given the followingmedications:  Encounter Medications    Orders Placed This Encounter   Medications    sodium chloride flush 0.9 % injection 10 mL    sodium chloride flush 0.9 % injection 10 mL    0.9 % sodium chloride IV bolus 2,217 mL    ketorolac (TORADOL) injection 15 mg    acetaminophen (TYLENOL) tablet 650 mg    cefTRIAXone (ROCEPHIN) 1 g IVPB in 50 mL D5W minibag    iopamidol (ISOVUE-370) 76 % injection 80 mL    sodium chloride flush 0.9 % injection 10 mL    sodium chloride flush 0.9 % injection 10 mL    magnesium hydroxide (MILK OF MAGNESIA) 400 MG/5ML suspension 30 mL    ondansetron (ZOFRAN) injection 4 mg    enoxaparin (LOVENOX) injection 40 mg    OR Linked Order Group      potassium chloride (KLOR-CON M) extended release tablet 40 mEq      potassium bicarb-citric acid (EFFER-K) effervescent tablet 40 mEq      potassium chloride 10 mEq/100 mL IVPB (Peripheral Line)    acetaminophen (TYLENOL) suppository 650 mg    0.9 % sodium chloride infusion    pantoprazole (PROTONIX) injection 40 mg    cefTRIAXone (ROCEPHIN) 1 g IVPB in 50 mL D5W minibag    morphine (PF) injection 2 mg            CONSULTS:  IP CONSULT TO HOSPITALIST  IP CONSULT TO UROLOGY  IP CONSULT TO [address] [place] [address] / FABIAN / Adrian Ferreira      Marc Hylton is a 44 y.o. female history of nephrolithiasis presented with right-sided flank pain and fever, dysuria.     Patient has history and exam as above. She is a history of nephrolithiasis. She was febrile on my exam with right-sided CVA tenderness. She also reported some neck pain, however had absolutely no meningismus on my exam, no altered sensorium. Patient has been having symptoms for 3 days. Her urine had signs of infection. There was concern for intra-abdominal pathology, so CT abdomen and pelvis with contrast was obtained and concerning for pyelonephritis on the right with punctate renal calculi bilaterally. Urology was consulted and recommended no urgent surgical intervention. Patient was given ceftriaxone and fluids, Tylenol for comfort. She was admitted to medicine for intravenous antibiotics. Patient agreed with the plan and verbalized understanding.     This patient was also evaluated by the attending physician. All care plans werediscussed and agreed upon.     Clinical Impression      1.  Acute pyelonephritis          Disposition      PATIENT REFERRED TO:  Sadiq Billy MD  [id number] [facility] [place] [address]  [phone]                 DISCHARGE MEDICATIONS:  There are no discharge medications for this patient.        DISPOSITION Admitted 04/14/2019 06:05:51 PM        Abhijeet Alejo MD PhD  Resident  04/14/19 8678                    Abhijeet Alejo MD PhD  Resident  06/04/19 8620

## 2019-08-13 ENCOUNTER — HOSPITAL ENCOUNTER (EMERGENCY)
Age: 44
Discharge: HOME | End: 2019-08-14
Payer: COMMERCIAL

## 2019-08-13 DIAGNOSIS — B02.9 HERPES ZOSTER WITHOUT COMPLICATION: Primary | ICD-10-CM

## 2019-08-13 PROCEDURE — 99282 EMERGENCY DEPT VISIT SF MDM: CPT

## 2019-08-14 VITALS
RESPIRATION RATE: 16 BRPM | SYSTOLIC BLOOD PRESSURE: 98 MMHG | HEIGHT: 61 IN | TEMPERATURE: 98.5 F | BODY MASS INDEX: 31.15 KG/M2 | DIASTOLIC BLOOD PRESSURE: 64 MMHG | HEART RATE: 73 BPM | OXYGEN SATURATION: 100 % | WEIGHT: 165 LBS

## 2019-08-14 PROCEDURE — 6370000000 HC RX 637 (ALT 250 FOR IP): Performed by: PHYSICIAN ASSISTANT

## 2019-08-14 PROCEDURE — 96372 THER/PROPH/DIAG INJ SC/IM: CPT

## 2019-08-14 PROCEDURE — 6360000002 HC RX W HCPCS: Performed by: PHYSICIAN ASSISTANT

## 2019-08-14 RX ORDER — KETOROLAC TROMETHAMINE 30 MG/ML
15 INJECTION, SOLUTION INTRAMUSCULAR; INTRAVENOUS ONCE
Status: COMPLETED | OUTPATIENT
Start: 2019-08-14 | End: 2019-08-14

## 2019-08-14 RX ORDER — NAPROXEN 500 MG/1
500 TABLET ORAL 2 TIMES DAILY WITH MEALS
Qty: 30 TABLET | Refills: 0 | Status: SHIPPED | OUTPATIENT
Start: 2019-08-14 | End: 2020-06-03 | Stop reason: ALTCHOICE

## 2019-08-14 RX ORDER — TRAMADOL HYDROCHLORIDE 50 MG/1
25 TABLET ORAL ONCE
Status: COMPLETED | OUTPATIENT
Start: 2019-08-14 | End: 2019-08-14

## 2019-08-14 RX ORDER — ACYCLOVIR 800 MG/1
800 TABLET ORAL
Qty: 50 TABLET | Refills: 0 | Status: SHIPPED | OUTPATIENT
Start: 2019-08-14 | End: 2019-08-24

## 2019-08-14 RX ORDER — TRAMADOL HYDROCHLORIDE 50 MG/1
50 TABLET ORAL EVERY 6 HOURS PRN
Qty: 20 TABLET | Refills: 0 | Status: SHIPPED | OUTPATIENT
Start: 2019-08-14 | End: 2019-08-19

## 2019-08-14 RX ORDER — ACETAMINOPHEN 325 MG/1
650 TABLET ORAL EVERY 6 HOURS PRN
Qty: 60 TABLET | Refills: 0 | Status: SHIPPED | OUTPATIENT
Start: 2019-08-14 | End: 2020-06-03 | Stop reason: ALTCHOICE

## 2019-08-14 RX ORDER — TETRACAINE HYDROCHLORIDE 5 MG/ML
1 SOLUTION OPHTHALMIC ONCE
Status: COMPLETED | OUTPATIENT
Start: 2019-08-14 | End: 2019-08-14

## 2019-08-14 RX ADMIN — TRAMADOL HYDROCHLORIDE 25 MG: 50 TABLET, FILM COATED ORAL at 00:48

## 2019-08-14 RX ADMIN — FLUORESCEIN SODIUM 1 MG: 1 STRIP OPHTHALMIC at 01:20

## 2019-08-14 RX ADMIN — KETOROLAC TROMETHAMINE 15 MG: 30 INJECTION, SOLUTION INTRAMUSCULAR at 00:48

## 2019-08-14 RX ADMIN — TETRACAINE HYDROCHLORIDE 1 DROP: 5 SOLUTION OPHTHALMIC at 01:20

## 2019-08-14 ASSESSMENT — PAIN SCALES - GENERAL: PAINLEVEL_OUTOF10: 9

## 2019-08-14 NOTE — ED PROVIDER NOTES
hours as needed for Pain     Dispense:  60 tablet     Refill:  0       CONSULTS:  None    MEDICAL DECISION MAKING / ASSESSMENT / Norma Oralia is a 44 y.o. female presents today with 2 days of previously diagnosed herpes zoster with some extension across her right thorax over her right breast.  No history of HIV. She presents today because of concern for pain control and some new spots on her nose. She does have a single vesicle on her nose consistent with a positive Baum sign. She denies any vision changes or painful red eye and slit lamp examination did not reveal any corneal uptake consistent with herpetic keratitis. She was previously on acyclovir 500 mg 3 times a day, we will increase to 800 mg 5 times a day. She will follow-up with ophthalmology at the [facility] within the next 3 days even if her symptoms improve. If her symptoms worsen, she was instructed to present to the [facility] emergency room for immediate evaluation by ophthalmology. This patient was also evaluated by the attending physician. All care plans were discussed and agreed upon. Clinical Impression     1. Herpes zoster without complication        Disposition     PATIENT REFERRED TO:   emergency department  [facility] to   If symptoms worsen     Ophthalmology  [address] [place] [address] Ophthalmology Clinic  [phone]  Schedule an appointment as soon as possible for a visit in 3 days        DISCHARGE MEDICATIONS:  New Prescriptions    ACETAMINOPHEN (TYLENOL) 325 MG TABLET    Take 2 tablets by mouth every 6 hours as needed for Pain    ACYCLOVIR (ZOVIRAX) 800 MG TABLET    Take 1 tablet by mouth 5 times daily for 10 days    NAPROXEN (NAPROSYN) 500 MG TABLET    Take 1 tablet by mouth 2 times daily (with meals) for 30 doses    TRAMADOL (ULTRAM) 50 MG TABLET    Take 1 tablet by mouth every 6 hours as needed for Pain for up to 5 days. Intended supply: 7 days.  Take lowest dose possible to manage pain       DISPOSITION Decision To Discharge 08/14/2019 01:08:53 AM        Ramiro Crane PA-C  08/14/19 0124

## 2020-03-19 NOTE — CONSULTS
Urology Attending Consult Note      Reason for Consultation: Right pyelonephritis     History: 43 yo F with h/o stones, presents with fever and Rt flank pain. CT suggestive for Rt pyelonephritis. Bilateral non-obstructing punctate stones noted. Labs WNL. UA suggestive for infection with + nitrites. Culture pending. Currently on Rocephin. Family History, Social History, Review of Systems:  Reviewed and agreed to as per chart    Vitals:  /72   Pulse 79   Temp 98.8 °F (37.1 °C) (Oral)   Resp 16   Ht 5' 3\" (1.6 m)   Wt 163 lb (73.9 kg)   LMP 04/10/2019   SpO2 98%   BMI 28.87 kg/m²   Temp  Av.8 °F (37.7 °C)  Min: 97.9 °F (36.6 °C)  Max: 103.5 °F (39.7 °C)    Intake/Output Summary (Last 24 hours) at 4/15/2019 1147  Last data filed at 4/15/2019 8538  Gross per 24 hour   Intake 2540 ml   Output 2200 ml   Net 340 ml         Physical:   Well developed, well nourished in no acute distress   Mood indicates no abnormalities. Pt doesnt appear depressed   Orientated to time and place   Neck is supple, trachea is midline   Respiratory effort is normal   Cardiovascular show no extremity swelling   Abdomen no masses or hernias are palpated, there is no tenderness. Liver and Spleen appear normal.   Skin show no abnormal lesions   Lymph nodes are not palpated in the inguinal, neck, or axillary area.     Female :    External Genitalia show no irritation or erythema   Urethral Meatus appears to be normal in size and location   Urethra is normal with no tenderness or masses   Bladder is non tender   Voiding clear urine       Labs:  WBC:    Lab Results   Component Value Date    WBC 4.9 04/15/2019     Hemoglobin/Hematocrit:    Lab Results   Component Value Date    HGB 10.8 04/15/2019    HCT 32.5 04/15/2019     BMP:    Lab Results   Component Value Date     04/15/2019    K 4.0 04/15/2019     04/15/2019    CO2 19 04/15/2019    BUN 9 04/15/2019    LABALBU 3.8 2019    CREATININE <0.5 04/15/2019    CALCIUM 8.1 04/15/2019    GFRAA >60 04/15/2019    LABGLOM >60 04/15/2019     PT/INR:  No results found for: PROTIME, INR  PTT:  No results found for: APTT[APTT    Urinalysis: mod blood, + nitrites, mod leuks, 10-20 WBC    Urine Culture: pending     Blood Culture: pending     Antibiotic Therapy:  Rocephin. Imaging: CT abd/pelvis 4/14/19  1. Abnormal right kidney with delayed nephrogram and a couple areas of ill-defined hypoattenuation in the parenchyma with mild surrounding inflammatory change. The findings are suspicious for pyelonephritis. Correlate clinically with physical exam and    urinalysis. 2. Punctate bilateral nephrolithiasis. 3. Normal appendix. Impression/Plan: 45 yo F with h/o stones, presents with Rt pyelonephritis and fever   -febrile @ 103.5 on admission.  Afebrile now   -continue abx  -continue with conservative medical management for now with IV fluid and abx  -will follow     AIDEN Parra - CNP Quality 110: Preventive Care And Screening: Influenza Immunization: Influenza Immunization not Administered because Patient Refused. Detail Level: Detailed Quality 226: Preventive Care And Screening: Tobacco Use: Screening And Cessation Intervention: Tobacco Screening not Performed for Medical Reasons Quality 402: Tobacco Use And Help With Quitting Among Adolescents: Patient screened for tobacco and never smoked Quality 431: Preventive Care And Screening: Unhealthy Alcohol Use - Screening: Patient screened for unhealthy alcohol use using a single question and scores less than 2 times per year

## 2020-06-03 ENCOUNTER — APPOINTMENT (OUTPATIENT)
Dept: GENERAL RADIOLOGY | Age: 45
End: 2020-06-03
Payer: COMMERCIAL

## 2020-06-03 ENCOUNTER — HOSPITAL ENCOUNTER (EMERGENCY)
Age: 45
Discharge: HOME | End: 2020-06-03
Attending: EMERGENCY MEDICINE
Payer: COMMERCIAL

## 2020-06-03 VITALS
RESPIRATION RATE: 16 BRPM | SYSTOLIC BLOOD PRESSURE: 103 MMHG | HEART RATE: 73 BPM | DIASTOLIC BLOOD PRESSURE: 61 MMHG | TEMPERATURE: 98.1 F | OXYGEN SATURATION: 96 %

## 2020-06-03 LAB
ALBUMIN SERPL-MCNC: 4.4 G/DL (ref 3.4–5)
ALP BLD-CCNC: 86 U/L (ref 40–129)
ALT SERPL-CCNC: 26 U/L (ref 10–40)
ANION GAP SERPL CALCULATED.3IONS-SCNC: 13 MMOL/L (ref 3–16)
AST SERPL-CCNC: 33 U/L (ref 15–37)
BASOPHILS ABSOLUTE: 0 K/UL (ref 0–0.2)
BASOPHILS RELATIVE PERCENT: 0.5 %
BILIRUB SERPL-MCNC: 0.5 MG/DL (ref 0–1)
BILIRUBIN DIRECT: <0.2 MG/DL (ref 0–0.3)
BILIRUBIN, INDIRECT: ABNORMAL MG/DL (ref 0–1)
BUN BLDV-MCNC: 15 MG/DL (ref 7–20)
CALCIUM SERPL-MCNC: 8.9 MG/DL (ref 8.3–10.6)
CHLORIDE BLD-SCNC: 102 MMOL/L (ref 99–110)
CO2: 22 MMOL/L (ref 21–32)
CREAT SERPL-MCNC: 0.7 MG/DL (ref 0.6–1.1)
EOSINOPHILS ABSOLUTE: 0.5 K/UL (ref 0–0.6)
EOSINOPHILS RELATIVE PERCENT: 8.9 %
GFR AFRICAN AMERICAN: >60
GFR NON-AFRICAN AMERICAN: >60
GLUCOSE BLD-MCNC: 86 MG/DL (ref 70–99)
HCT VFR BLD CALC: 36.5 % (ref 36–48)
HEMOGLOBIN: 12.5 G/DL (ref 12–16)
LIPASE: 71 U/L (ref 13–60)
LYMPHOCYTES ABSOLUTE: 1.4 K/UL (ref 1–5.1)
LYMPHOCYTES RELATIVE PERCENT: 26 %
MCH RBC QN AUTO: 28.5 PG (ref 26–34)
MCHC RBC AUTO-ENTMCNC: 34.2 G/DL (ref 31–36)
MCV RBC AUTO: 83.2 FL (ref 80–100)
MONOCYTES ABSOLUTE: 0.7 K/UL (ref 0–1.3)
MONOCYTES RELATIVE PERCENT: 12.8 %
NEUTROPHILS ABSOLUTE: 2.8 K/UL (ref 1.7–7.7)
NEUTROPHILS RELATIVE PERCENT: 51.8 %
PDW BLD-RTO: 14.2 % (ref 12.4–15.4)
PLATELET # BLD: 236 K/UL (ref 135–450)
PMV BLD AUTO: 8.2 FL (ref 5–10.5)
POTASSIUM SERPL-SCNC: 4.3 MMOL/L (ref 3.5–5.1)
RBC # BLD: 4.39 M/UL (ref 4–5.2)
SODIUM BLD-SCNC: 137 MMOL/L (ref 136–145)
TOTAL PROTEIN: 8.5 G/DL (ref 6.4–8.2)
TROPONIN: <0.01 NG/ML
WBC # BLD: 5.4 K/UL (ref 4–11)

## 2020-06-03 PROCEDURE — 6370000000 HC RX 637 (ALT 250 FOR IP): Performed by: PHYSICIAN ASSISTANT

## 2020-06-03 PROCEDURE — 84484 ASSAY OF TROPONIN QUANT: CPT

## 2020-06-03 PROCEDURE — 80076 HEPATIC FUNCTION PANEL: CPT

## 2020-06-03 PROCEDURE — 6360000002 HC RX W HCPCS: Performed by: PHYSICIAN ASSISTANT

## 2020-06-03 PROCEDURE — 96374 THER/PROPH/DIAG INJ IV PUSH: CPT

## 2020-06-03 PROCEDURE — 94770 HC ETCO2 MONITOR DAILY: CPT

## 2020-06-03 PROCEDURE — 83690 ASSAY OF LIPASE: CPT

## 2020-06-03 PROCEDURE — 80048 BASIC METABOLIC PNL TOTAL CA: CPT

## 2020-06-03 PROCEDURE — 94761 N-INVAS EAR/PLS OXIMETRY MLT: CPT

## 2020-06-03 PROCEDURE — 85025 COMPLETE CBC W/AUTO DIFF WBC: CPT

## 2020-06-03 PROCEDURE — 2700000000 HC OXYGEN THERAPY PER DAY

## 2020-06-03 PROCEDURE — 99284 EMERGENCY DEPT VISIT MOD MDM: CPT

## 2020-06-03 PROCEDURE — 93005 ELECTROCARDIOGRAM TRACING: CPT | Performed by: PHYSICIAN ASSISTANT

## 2020-06-03 RX ORDER — ONDANSETRON 2 MG/ML
4 INJECTION INTRAMUSCULAR; INTRAVENOUS ONCE
Status: COMPLETED | OUTPATIENT
Start: 2020-06-03 | End: 2020-06-03

## 2020-06-03 RX ORDER — SUCRALFATE ORAL 1 G/10ML
1 SUSPENSION ORAL 4 TIMES DAILY PRN
Qty: 420 ML | Refills: 0 | Status: SHIPPED | OUTPATIENT
Start: 2020-06-03

## 2020-06-03 RX ORDER — ONDANSETRON 4 MG/1
4 TABLET, ORALLY DISINTEGRATING ORAL EVERY 8 HOURS PRN
Qty: 6 TABLET | Refills: 0 | Status: SHIPPED | OUTPATIENT
Start: 2020-06-03

## 2020-06-03 RX ORDER — LORATADINE 10 MG/1
10 TABLET ORAL DAILY
COMMUNITY

## 2020-06-03 RX ADMIN — ONDANSETRON 4 MG: 2 INJECTION INTRAMUSCULAR; INTRAVENOUS at 21:09

## 2020-06-03 RX ADMIN — LIDOCAINE HYDROCHLORIDE: 20 SOLUTION ORAL; TOPICAL at 21:09

## 2020-06-03 ASSESSMENT — ENCOUNTER SYMPTOMS
VOMITING: 1
SORE THROAT: 0
NAUSEA: 1
RHINORRHEA: 0
COUGH: 0
SHORTNESS OF BREATH: 0
ABDOMINAL PAIN: 1

## 2020-06-03 ASSESSMENT — PULMONARY FUNCTION TESTS: PIF_VALUE: 32

## 2020-06-03 ASSESSMENT — PAIN DESCRIPTION - LOCATION: LOCATION: CHEST

## 2020-06-03 ASSESSMENT — PAIN DESCRIPTION - PAIN TYPE: TYPE: ACUTE PAIN

## 2020-06-03 ASSESSMENT — PAIN SCALES - GENERAL: PAINLEVEL_OUTOF10: 6

## 2020-06-03 NOTE — ED PROVIDER NOTES
block (incomplete)  ST Segments: no acute change  T Waves: inversion in  v1 and aVr  Q Waves:none  Clinical Impression: no acute changes  Comparison:  None available    RECENT VITALS:  BP: 103/61, Temp: 98.1 °F (36.7 °C), Pulse: 73, Resp: 16, SpO2: 96 %     ED Course     Nursing Notes, Past Medical Hx,Past Surgical Hx, Social Hx, Allergies, and Family Hx were reviewed. The patient was given the following medications:  Orders Placed This Encounter   Medications    ondansetron (ZOFRAN) injection 4 mg    aluminum & magnesium hydroxide-simethicone (MAALOX) 30 mL, lidocaine viscous hcl (XYLOCAINE) 5 mL (GI COCKTAIL)    sucralfate (CARAFATE) 1 GM/10ML suspension     Sig: Take 10 mLs by mouth 4 times daily as needed (abdominal pain)     Dispense:  420 mL     Refill:  0    ondansetron (ZOFRAN ODT) 4 MG disintegrating tablet     Sig: Take 1 tablet by mouth every 8 hours as needed for Nausea     Dispense:  6 tablet     Refill:  0       CONSULTS:  None    MEDICAL DECISION MAKING / ASSESSMENT / Jerry Nereida is a 38 y.o. female with a history of GERD who presents with epigastric, midsternal and right-sided chest pain. She is belching frequently on exam.  Her pain is likely related to gastritis. IV access was established. She was given IV Zofran along with a GI cocktail which she tolerated without difficulty. Reported improvement upon reassessment. Labs included unremarkable CBC, renal panel, liver panel, lipase and troponin. Chest x-ray was completed yesterday and therefore not repeated today. The patient's chest pain is likely gastrointestinal in origin. Given that she had improvement with a GI cocktail, she is prescribed sucralfate to take in addition to her omeprazole at home as well as Zofran. She will follow-up with her primary care provider in 2 days if no improvement or return to the ED for any worsening symptoms. This patient was also evaluated by the attending physician.  All care

## 2020-06-03 NOTE — ED TRIAGE NOTES
Pt presents to ED with c/o CP and increased symptoms of GERD. Pt reports since yesterday that her fingers have been turning purple and cold.

## 2020-06-04 LAB
EKG ATRIAL RATE: 70 BPM
EKG DIAGNOSIS: NORMAL
EKG P AXIS: 71 DEGREES
EKG P-R INTERVAL: 166 MS
EKG Q-T INTERVAL: 400 MS
EKG QRS DURATION: 96 MS
EKG QTC CALCULATION (BAZETT): 432 MS
EKG R AXIS: 17 DEGREES
EKG T AXIS: 61 DEGREES
EKG VENTRICULAR RATE: 70 BPM

## 2020-06-04 NOTE — ED NOTES
Pt discharged at this time. All questions answered. Pt left alert and oriented to baseline. Skin is warm, dry and appropriate color.         Caren Mckinnon RN  06/03/20 0675

## 2021-04-10 ENCOUNTER — HOSPITAL ENCOUNTER (EMERGENCY)
Age: 46
Discharge: HOME | End: 2021-04-11
Attending: EMERGENCY MEDICINE

## 2021-04-10 DIAGNOSIS — R10.30 LOWER ABDOMINAL PAIN: Primary | ICD-10-CM

## 2021-04-10 PROCEDURE — 99284 EMERGENCY DEPT VISIT MOD MDM: CPT

## 2021-04-11 ENCOUNTER — APPOINTMENT (OUTPATIENT)
Dept: CT IMAGING | Age: 46
End: 2021-04-11

## 2021-04-11 VITALS
OXYGEN SATURATION: 97 % | TEMPERATURE: 98.8 F | DIASTOLIC BLOOD PRESSURE: 61 MMHG | HEART RATE: 67 BPM | RESPIRATION RATE: 20 BRPM | SYSTOLIC BLOOD PRESSURE: 100 MMHG

## 2021-04-11 LAB
A/G RATIO: 1 (ref 1.1–2.2)
ALBUMIN SERPL-MCNC: 3.9 G/DL (ref 3.4–5)
ALP BLD-CCNC: 77 U/L (ref 40–129)
ALT SERPL-CCNC: 21 U/L (ref 10–40)
ANION GAP SERPL CALCULATED.3IONS-SCNC: 10 MMOL/L (ref 3–16)
AST SERPL-CCNC: 23 U/L (ref 15–37)
BACTERIA WET PREP: NORMAL
BASOPHILS ABSOLUTE: 0 K/UL (ref 0–0.2)
BASOPHILS RELATIVE PERCENT: 0.4 %
BILIRUB SERPL-MCNC: 0.3 MG/DL (ref 0–1)
BILIRUBIN URINE: NEGATIVE
BLOOD, URINE: NEGATIVE
BUN BLDV-MCNC: 16 MG/DL (ref 7–20)
CALCIUM SERPL-MCNC: 9.3 MG/DL (ref 8.3–10.6)
CHLORIDE BLD-SCNC: 103 MMOL/L (ref 99–110)
CLARITY: CLEAR
CLUE CELLS: NORMAL
CO2: 25 MMOL/L (ref 21–32)
COLOR: YELLOW
CREAT SERPL-MCNC: 0.6 MG/DL (ref 0.6–1.1)
EOSINOPHILS ABSOLUTE: 0.6 K/UL (ref 0–0.6)
EOSINOPHILS RELATIVE PERCENT: 13.1 %
EPITHELIAL CELLS WET PREP: NORMAL
GFR AFRICAN AMERICAN: >60
GFR NON-AFRICAN AMERICAN: >60
GLOBULIN: 3.8 G/DL
GLUCOSE BLD-MCNC: 97 MG/DL (ref 70–99)
GLUCOSE URINE: NEGATIVE MG/DL
HCG(URINE) PREGNANCY TEST: NEGATIVE
HCT VFR BLD CALC: 33.5 % (ref 36–48)
HEMOGLOBIN: 11.5 G/DL (ref 12–16)
KETONES, URINE: NEGATIVE MG/DL
LEUKOCYTE ESTERASE, URINE: NEGATIVE
LYMPHOCYTES ABSOLUTE: 1.1 K/UL (ref 1–5.1)
LYMPHOCYTES RELATIVE PERCENT: 21.8 %
MCH RBC QN AUTO: 27.5 PG (ref 26–34)
MCHC RBC AUTO-ENTMCNC: 34.3 G/DL (ref 31–36)
MCV RBC AUTO: 80.3 FL (ref 80–100)
MICROSCOPIC EXAMINATION: NORMAL
MONOCYTES ABSOLUTE: 0.8 K/UL (ref 0–1.3)
MONOCYTES RELATIVE PERCENT: 15.2 %
NEUTROPHILS ABSOLUTE: 2.5 K/UL (ref 1.7–7.7)
NEUTROPHILS RELATIVE PERCENT: 49.5 %
NITRITE, URINE: NEGATIVE
PDW BLD-RTO: 14.5 % (ref 12.4–15.4)
PH UA: 7 (ref 5–8)
PLATELET # BLD: 221 K/UL (ref 135–450)
PMV BLD AUTO: 8 FL (ref 5–10.5)
POTASSIUM REFLEX MAGNESIUM: 4 MMOL/L (ref 3.5–5.1)
PROTEIN UA: NEGATIVE MG/DL
RBC # BLD: 4.18 M/UL (ref 4–5.2)
RBC WET PREP: NORMAL
SODIUM BLD-SCNC: 138 MMOL/L (ref 136–145)
SOURCE WET PREP: NORMAL
SPECIFIC GRAVITY UA: 1.02 (ref 1–1.03)
TOTAL PROTEIN: 7.7 G/DL (ref 6.4–8.2)
TRICHOMONAS PREP: NORMAL
URINE TYPE: NORMAL
UROBILINOGEN, URINE: 1 E.U./DL
WBC # BLD: 4.9 K/UL (ref 4–11)
WBC WET PREP: NORMAL
YEAST WET PREP: NORMAL

## 2021-04-11 PROCEDURE — 87210 SMEAR WET MOUNT SALINE/INK: CPT

## 2021-04-11 PROCEDURE — 87491 CHLMYD TRACH DNA AMP PROBE: CPT

## 2021-04-11 PROCEDURE — 74177 CT ABD & PELVIS W/CONTRAST: CPT

## 2021-04-11 PROCEDURE — 84703 CHORIONIC GONADOTROPIN ASSAY: CPT

## 2021-04-11 PROCEDURE — 80053 COMPREHEN METABOLIC PANEL: CPT

## 2021-04-11 PROCEDURE — 6360000002 HC RX W HCPCS: Performed by: EMERGENCY MEDICINE

## 2021-04-11 PROCEDURE — 96374 THER/PROPH/DIAG INJ IV PUSH: CPT

## 2021-04-11 PROCEDURE — 6360000004 HC RX CONTRAST MEDICATION: Performed by: EMERGENCY MEDICINE

## 2021-04-11 PROCEDURE — 81003 URINALYSIS AUTO W/O SCOPE: CPT

## 2021-04-11 PROCEDURE — 85025 COMPLETE CBC W/AUTO DIFF WBC: CPT

## 2021-04-11 PROCEDURE — 87591 N.GONORRHOEAE DNA AMP PROB: CPT

## 2021-04-11 PROCEDURE — 36415 COLL VENOUS BLD VENIPUNCTURE: CPT

## 2021-04-11 RX ORDER — KETOROLAC TROMETHAMINE 30 MG/ML
15 INJECTION, SOLUTION INTRAMUSCULAR; INTRAVENOUS ONCE
Status: COMPLETED | OUTPATIENT
Start: 2021-04-11 | End: 2021-04-11

## 2021-04-11 RX ADMIN — KETOROLAC TROMETHAMINE 15 MG: 30 INJECTION, SOLUTION INTRAMUSCULAR at 02:29

## 2021-04-11 RX ADMIN — IOPAMIDOL 80 ML: 755 INJECTION, SOLUTION INTRAVENOUS at 03:07

## 2021-04-11 ASSESSMENT — ENCOUNTER SYMPTOMS
STRIDOR: 0
NAUSEA: 0
SHORTNESS OF BREATH: 0
EYES NEGATIVE: 1
ABDOMINAL PAIN: 1
ANAL BLEEDING: 0
DIARRHEA: 0
CHOKING: 0
BLOOD IN STOOL: 0
BACK PAIN: 0
WHEEZING: 0
COUGH: 0

## 2021-04-11 ASSESSMENT — PAIN DESCRIPTION - ORIENTATION: ORIENTATION: RIGHT;LOWER

## 2021-04-11 ASSESSMENT — PAIN DESCRIPTION - FREQUENCY: FREQUENCY: CONTINUOUS

## 2021-04-11 ASSESSMENT — PAIN DESCRIPTION - PAIN TYPE: TYPE: ACUTE PAIN

## 2021-04-11 ASSESSMENT — PAIN SCALES - GENERAL: PAINLEVEL_OUTOF10: 7

## 2021-04-11 ASSESSMENT — PAIN DESCRIPTION - LOCATION: LOCATION: ABDOMEN;BACK

## 2021-04-11 ASSESSMENT — PAIN DESCRIPTION - ONSET: ONSET: PROGRESSIVE

## 2021-04-11 NOTE — ED NOTES
Pelvic exam done per Dr. Tez Rice and Pop Orellana, specimens collected and sent to lab. Patient tolerated well.       Yassine Ortiz RN  04/11/21 1141

## 2021-04-11 NOTE — ED PROVIDER NOTES
confusion, decreased concentration, dysphoric mood and hallucinations. Past Medical, Surgical, Family, and Social History     She has a past medical history of Acid reflux, Gallstones, GERD (gastroesophageal reflux disease), and Kidney stone. She has a past surgical history that includes Cholecystectomy (2011). Her family history includes Heart Disease in her maternal grandmother; High Cholesterol in her maternal grandmother and mother. She reports that she has never smoked. She has never used smokeless tobacco. She reports current alcohol use. She reports that she does not use drugs. Medications     Previous Medications    LORATADINE (CLARITIN) 10 MG TABLET    Take 10 mg by mouth daily prn    OMEPRAZOLE (PRILOSEC) 40 MG DELAYED RELEASE CAPSULE    Take 40 mg by mouth daily    ONDANSETRON (ZOFRAN ODT) 4 MG DISINTEGRATING TABLET    Take 1 tablet by mouth every 8 hours as needed for Nausea    SUCRALFATE (CARAFATE) 1 GM/10ML SUSPENSION    Take 10 mLs by mouth 4 times daily as needed (abdominal pain)       Allergies     She has No Known Allergies. Physical Exam     INITIAL VITALS: BP: 110/66, Temp: 98.8 °F (37.1 °C), Pulse: 67, Resp: 20, SpO2: 99 %   Physical Exam  Constitutional:       General: She is not in acute distress. Appearance: She is well-developed. She is not ill-appearing. HENT:      Head: Normocephalic. Cardiovascular:      Rate and Rhythm: Normal rate and regular rhythm. Heart sounds: No murmur. No friction rub. No gallop. Pulmonary:      Effort: Pulmonary effort is normal. No respiratory distress. Breath sounds: Normal breath sounds. No wheezing or rales. Abdominal:      General: Bowel sounds are normal.      Palpations: Abdomen is soft. Tenderness: There is abdominal tenderness in the suprapubic area. There is right CVA tenderness. There is no left CVA tenderness, guarding or rebound. Negative signs include Terry's sign and McBurney's sign.    Genitourinary: Vagina: No tenderness. Skin:     General: Skin is warm. Capillary Refill: Capillary refill takes less than 2 seconds. Neurological:      Mental Status: She is alert and oriented to person, place, and time.    Psychiatric:         Mood and Affect: Mood normal.         Behavior: Behavior normal.             Diagnostic Results     RADIOLOGY:  No orders to display       LABS:   Results for orders placed or performed during the hospital encounter of 04/10/21   Urinalysis, reflex to microscopic (Lab)   Result Value Ref Range    Color, UA Yellow Straw/Yellow    Clarity, UA Clear Clear    Glucose, Ur Negative Negative mg/dL    Bilirubin Urine Negative Negative    Ketones, Urine Negative Negative mg/dL    Specific Gravity, UA 1.020 1.005 - 1.030    Blood, Urine Negative Negative    pH, UA 7.0 5.0 - 8.0    Protein, UA Negative Negative mg/dL    Urobilinogen, Urine 1.0 <2.0 E.U./dL    Nitrite, Urine Negative Negative    Leukocyte Esterase, Urine Negative Negative    Microscopic Examination Not Indicated     Urine Type NotGiven    Pregnancy, urine   Result Value Ref Range    HCG(Urine) Pregnancy Test Negative Detects HCG level >20 MIU/mL   CBC auto differential   Result Value Ref Range    WBC 4.9 4.0 - 11.0 K/uL    RBC 4.18 4.00 - 5.20 M/uL    Hemoglobin 11.5 (L) 12.0 - 16.0 g/dL    Hematocrit 33.5 (L) 36.0 - 48.0 %    MCV 80.3 80.0 - 100.0 fL    MCH 27.5 26.0 - 34.0 pg    MCHC 34.3 31.0 - 36.0 g/dL    RDW 14.5 12.4 - 15.4 %    Platelets 912 985 - 394 K/uL    MPV 8.0 5.0 - 10.5 fL    Neutrophils % 49.5 %    Lymphocytes % 21.8 %    Monocytes % 15.2 %    Eosinophils % 13.1 %    Basophils % 0.4 %    Neutrophils Absolute 2.5 1.7 - 7.7 K/uL    Lymphocytes Absolute 1.1 1.0 - 5.1 K/uL    Monocytes Absolute 0.8 0.0 - 1.3 K/uL    Eosinophils Absolute 0.6 0.0 - 0.6 K/uL    Basophils Absolute 0.0 0.0 - 0.2 K/uL       ED BEDSIDE ULTRASOUND:  None    RECENT VITALS:  BP: 105/65, Temp: 98.8 °F (37.1 °C),Pulse: 67, Resp: 20, SpO2: 95 %     Procedures     None    ED Course     Nursing Notes, Past Medical Hx, Past Surgical Hx, Social Hx, Allergies, and FamilyHx were reviewed. The patient was giventhe following medications:  No orders of the defined types were placed in this encounter. CONSULTS:  None    MEDICAL DECISION MAKING / ASSESSMENT / PLAN     Dona Erazo is a 41 y.o. female who presented suprapubic and right back pain. My concern is for either pyelonephritis or a urethral infection from an STD or vaginal candidiasis or a combination of the above. I will check urinalysis, pregnancy test, chlamydia and Neisseria, wet prep for Gardnerella/trichomonas/Candida and a CBC with CMP. If her pregnancy test is negative it would be reasonable to get a CT of her abdomen and pelvis. This patient was also evaluated by the attending physician. All care plans were discussed and agreed upon. Clinical Impression     Suspected pyelonephritis versus urethral infection. Disposition     PATIENT REFERRED TO:  No follow-up provider specified.     DISCHARGE MEDICATIONS:  New Prescriptions    No medications on file       DISPOSITION     To be decided          Charisse Calvo MD  Resident  04/11/21 [id number]

## 2021-04-11 NOTE — ED PROVIDER NOTES
ED Attending Attestation Note     Date of evaluation: 4/10/2021    This patient was seen by the resident. I have seen and examined the patient, agree with the workup, evaluation, management and diagnosis. The care plan has been discussed. My assessment reveals adult female with some lower abdominal tenderness and a feeling of pressure. Is been ongoing for approximately a week or so. She is mild to moderately tender in the lower abdomen across the bilateral lower quadrants and to the pelvic region. There is no rebound or guarding. On pelvic exam, there is scant amount of white vaginal discharge, no significant cervical erythema, mild discomfort with cervical manipulation without chandelier sign, and no obvious signs of cervicitis or PID otherwise. She denies any new sexual partners, discharge, or infectious/ STI type symptoms. Her lab work is otherwise unremarkable. CT scan the abdomen pelvis demonstrates no acute abnormality. On examination she is feeling better, has a soft abdomen, reassuring vital signs, and is comfortable with plan for discharge and follow-up with her PCP and return for any worsening symptoms.       IMPRESSION  1) lower abd pain       Saleem Galo MD  04/11/21 Raphael Pereira MD  04/11/21 [id number]

## 2021-04-12 LAB
C TRACH DNA GENITAL QL NAA+PROBE: NEGATIVE
N. GONORRHOEAE DNA: NEGATIVE

## 2021-12-02 ENCOUNTER — HOSPITAL ENCOUNTER (EMERGENCY)
Age: 46
Discharge: HOME | End: 2021-12-03
Attending: EMERGENCY MEDICINE
Payer: COMMERCIAL

## 2021-12-02 VITALS
DIASTOLIC BLOOD PRESSURE: 77 MMHG | OXYGEN SATURATION: 100 % | RESPIRATION RATE: 16 BRPM | HEART RATE: 74 BPM | TEMPERATURE: 98 F | SYSTOLIC BLOOD PRESSURE: 115 MMHG

## 2021-12-02 DIAGNOSIS — S46.811A STRAIN OF RIGHT TRAPEZIUS MUSCLE, INITIAL ENCOUNTER: Primary | ICD-10-CM

## 2021-12-02 PROCEDURE — 93005 ELECTROCARDIOGRAM TRACING: CPT | Performed by: EMERGENCY MEDICINE

## 2021-12-02 PROCEDURE — 99283 EMERGENCY DEPT VISIT LOW MDM: CPT

## 2021-12-02 PROCEDURE — 6370000000 HC RX 637 (ALT 250 FOR IP): Performed by: PHYSICIAN ASSISTANT

## 2021-12-02 PROCEDURE — 6360000002 HC RX W HCPCS: Performed by: PHYSICIAN ASSISTANT

## 2021-12-02 PROCEDURE — 96372 THER/PROPH/DIAG INJ SC/IM: CPT

## 2021-12-02 RX ORDER — KETOROLAC TROMETHAMINE 30 MG/ML
15 INJECTION, SOLUTION INTRAMUSCULAR; INTRAVENOUS ONCE
Status: COMPLETED | OUTPATIENT
Start: 2021-12-02 | End: 2021-12-02

## 2021-12-02 RX ORDER — METHOCARBAMOL 500 MG/1
1000 TABLET, FILM COATED ORAL 3 TIMES DAILY
Qty: 60 TABLET | Refills: 0 | Status: SHIPPED | OUTPATIENT
Start: 2021-12-02 | End: 2021-12-12

## 2021-12-02 RX ORDER — METHOCARBAMOL 500 MG/1
1000 TABLET, FILM COATED ORAL ONCE
Status: COMPLETED | OUTPATIENT
Start: 2021-12-02 | End: 2021-12-02

## 2021-12-02 RX ORDER — NAPROXEN 500 MG/1
500 TABLET ORAL 2 TIMES DAILY PRN
Qty: 30 TABLET | Refills: 0 | Status: SHIPPED | OUTPATIENT
Start: 2021-12-02

## 2021-12-02 RX ORDER — LIDOCAINE 4 G/G
1 PATCH TOPICAL ONCE
Status: DISCONTINUED | OUTPATIENT
Start: 2021-12-02 | End: 2021-12-03 | Stop reason: HOSPADM

## 2021-12-02 RX ADMIN — KETOROLAC TROMETHAMINE 15 MG: 30 INJECTION, SOLUTION INTRAMUSCULAR; INTRAVENOUS at 23:36

## 2021-12-02 RX ADMIN — METHOCARBAMOL 1000 MG: 500 TABLET ORAL at 23:34

## 2021-12-02 ASSESSMENT — PAIN SCALES - GENERAL
PAINLEVEL_OUTOF10: 8
PAINLEVEL_OUTOF10: 8

## 2021-12-02 ASSESSMENT — PAIN DESCRIPTION - LOCATION: LOCATION: CHEST;NECK

## 2021-12-02 ASSESSMENT — PAIN DESCRIPTION - PAIN TYPE: TYPE: ACUTE PAIN

## 2021-12-03 NOTE — ED PROVIDER NOTES
ED Attending Attestation Note     Date of evaluation: 12/2/2021    This patient was seen by the advance practice provider. I have seen and examined the patient, agree with the workup, evaluation, management and diagnosis. The care plan has been discussed. My assessment reveals a 60-year-old female presenting with right lateral neck spasm for the last week. She has pain to palpation of the trapezius, and pain with rotation, but no midline spine tenderness. She has full strength of her right upper extremity, intact sensation in the median radial and ulnar nerve distributions and a 2+ radial pulse.         Jonh Kat MD  12/02/21 8705

## 2021-12-04 LAB
EKG ATRIAL RATE: 77 BPM
EKG DIAGNOSIS: NORMAL
EKG P AXIS: 70 DEGREES
EKG P-R INTERVAL: 164 MS
EKG Q-T INTERVAL: 400 MS
EKG QRS DURATION: 96 MS
EKG QTC CALCULATION (BAZETT): 452 MS
EKG R AXIS: 21 DEGREES
EKG T AXIS: 57 DEGREES
EKG VENTRICULAR RATE: 77 BPM

## 2021-12-13 ASSESSMENT — ENCOUNTER SYMPTOMS
COUGH: 0
EYE REDNESS: 0
VOMITING: 0
COLOR CHANGE: 0
SHORTNESS OF BREATH: 0
NAUSEA: 0
SORE THROAT: 0
SINUS PRESSURE: 0
DIARRHEA: 0
EYE ITCHING: 0
ABDOMINAL DISTENTION: 0
RHINORRHEA: 0
ABDOMINAL PAIN: 0
BACK PAIN: 0
WHEEZING: 0
CHEST TIGHTNESS: 0
EYE PAIN: 0

## 2021-12-13 NOTE — ED PROVIDER NOTES
[address] ENCOUNTER          PHYSICIAN ASSISTANT NOTE       Date of evaluation: 12/2/2021    Chief Complaint     Neck Pain (pain radiates from chest to right side of neck) and Chest Pain (right-sided chest pain and swelling, worsens with lying down)      History of Present Illness     Rocio Sandra is a 40 y.o. female with a past medical history as noted below who presents to the Emergency Department with a complaint of right-sided neck and chest wall pain. The patient reports that she began to experience an acute onset of tightness of the right neck and chest wall which started last week and has been progressing for that period of time. She reports that it is painful when she is lying down, makes it difficult for her to sleep. Pain is also worse when she rotates her neck from side to side, particularly to the left side. Pain is described along the ridge of the right shoulder radiating up her neck along the right side to the back of her head and down the right chest wall. No associated shortness of breath, diaphoresis, nausea, vomiting, lightheadedness, dizziness or other sick symptoms. No history of significant cardiac risk factors. She has not taken anything for her symptoms. Denies any recent infectious symptoms, including cough, congestion, fevers, chills, sweats or other constitutional symptoms. This history was obtained with the assistance of a [place]e-speaking . Review of Systems     Review of Systems   Constitutional: Negative for chills, diaphoresis, fever and unexpected weight change. HENT: Negative for congestion, drooling, mouth sores, postnasal drip, rhinorrhea, sinus pressure and sore throat. Eyes: Negative for pain, redness and itching. Respiratory: Negative for cough, chest tightness, shortness of breath and wheezing. Cardiovascular: Negative for chest pain, palpitations and leg swelling.    Gastrointestinal: Negative for abdominal distention, abdominal pain, diarrhea, nausea and vomiting. Genitourinary: Negative for dysuria and hematuria. Musculoskeletal: Positive for neck pain. Negative for arthralgias, back pain, gait problem, myalgias and neck stiffness. Right-sided neck, shoulder and chest wall pain   Skin: Negative for color change, pallor and rash. Neurological: Negative for dizziness, seizures, syncope, weakness, light-headedness, numbness and headaches. Hematological: Does not bruise/bleed easily. Psychiatric/Behavioral: Negative for agitation, hallucinations, self-injury, sleep disturbance and suicidal ideas. The patient is not nervous/anxious. All other systems reviewed and are negative. Physical Exam     INITIAL VITALS: BP: 115/77, Temp: 98 °F (36.7 °C), Pulse: 74, Resp: 16, SpO2: 100 %     Nursing note and vitals reviewed. Physical Exam  Vitals and nursing note reviewed. Constitutional:       General: She is not in acute distress. Appearance: She is well-developed. She is not diaphoretic. HENT:      Head: Normocephalic and atraumatic. Eyes:      General: No scleral icterus. Conjunctiva/sclera: Conjunctivae normal.      Pupils: Pupils are equal, round, and reactive to light. Neck:      Vascular: No JVD. Cardiovascular:      Rate and Rhythm: Normal rate and regular rhythm. Heart sounds: Normal heart sounds. Pulmonary:      Effort: Pulmonary effort is normal. No respiratory distress. Breath sounds: Normal breath sounds. No stridor. No wheezing or rales. Chest:      Chest wall: Tenderness present. No deformity, swelling or crepitus. Abdominal:      General: There is no distension. Palpations: Abdomen is soft. Tenderness: There is no abdominal tenderness. Musculoskeletal:         General: Normal range of motion. Right shoulder: Tenderness present. No swelling, deformity, effusion or bony tenderness. Normal range of motion.         Arms:       Cervical back: Normal range of motion and neck supple. Skin:     General: Skin is warm and dry. Coloration: Skin is not pale. Findings: No rash. Neurological:      Mental Status: She is alert and oriented to person, place, and time. Psychiatric:         Behavior: Behavior normal.          Procedures     N/A    MEDICAL DECISION MAKING     Guerita Ramires is admitted to the Emergency Department for evaluation of her chief complaint as described in the history of present illness. Complete history and physical was performed by me and my attending. Nursing notes, past medical history, surgical history, family history and social history were reviewed and addressed in the HPI. Debra Elizalde is a 40 y.o. female who presents to the emergency department with a complaint of right-sided, shoulder and chest wall pain. Patient reports that for the past week.,  She has been experiencing right-sided neck, shoulder and chest wall pain. Pain worse with movement or with laying down. No other associated symptoms. No significant cardiac risk factors. Physical examination demonstrates tenderness along the right trapezius extending from the base of the occiput on the right side along the left lateral neck, left shoulder and radiating to the left chest wall. The patient's trapezius muscle is ropelike to palpation and reproduces her pain. She is hemodynamically stable and within normal limits. An EKG performed during nursing triage demonstrates no significant abnormalities. Signs and symptoms appear most consistent with musculoskeletal trapezius strain. The patient has been evaluated and the history and physical exam suggest a benign etiology. I see nothing to suggest coronary ischemia, myocardial infarction, pulmonary embolism, thoracic aortic dissection, significant pericarditis, pneumonia, pneumothorax, or acute abdomen. I feel the patient can be safely discharged to home with outpatient follow up.   Instructions have been given for the patient to return to the ED for any worsening of the symptoms, including but not limited to increased pain, shortness of breath, abdominal pain or weakness. The patient was administered Toradol and methocarbamol and had a lidocaine patch placed in the emergency department with significant improvement in her some. She will be prescribed a short course of NSAIDs, methocarbamol and recommend over-the-counter lidocaine patches for trapezius strain. She will also be provided with physical therapy exercises to help loosen this muscle set. Follow-up via primary care. I discussed this plan at length the patient who verbalizes understanding and is in agreement. The patient is currently stable and will be discharged home for continued self-care. Please see patient's AVS for additional discharge instructions. The patient was seen and evaluated by myself and the attending physician, Alvaro Chavez MD, who agrees with my assessment, treatment and plan. Clinical Impression     1. Strain of right trapezius muscle, initial encounter        Disposition     DISPOSITION Decision To Discharge 12/02/2021 11:38:21 PM        Nabeel Bowie. [employer]  4:44 AM    Relevant History and Diagnostic Information     Past Medical, Surgical, Family, and Social History     She has a past medical history of Acid reflux, Gallstones, GERD (gastroesophageal reflux disease), and Kidney stone. She has a past surgical history that includes Cholecystectomy (2011). Her family history includes Heart Disease in her maternal grandmother; High Cholesterol in her maternal grandmother and mother. She reports that she has never smoked. She has never used smokeless tobacco. She reports current alcohol use. She reports that she does not use drugs.     Medications     Discharge Medication List as of 12/2/2021 11:53 PM      CONTINUE these medications which have NOT CHANGED    Details   omeprazole (PRILOSEC) 40 MG delayed release capsule Take 40 mg by mouth dailyHistorical Med      loratadine (CLARITIN) 10 MG tablet Take 10 mg by mouth daily prnHistorical Med      sucralfate (CARAFATE) 1 GM/10ML suspension Take 10 mLs by mouth 4 times daily as needed (abdominal pain), Disp-420 mL, R-0Print      ondansetron (ZOFRAN ODT) 4 MG disintegrating tablet Take 1 tablet by mouth every 8 hours as needed for Nausea, Disp-6 tablet, R-0Print             Allergies     She has No Known Allergies. ED Course     Nursing Notes, Past Medical Hx, Past Surgical Hx, Social Hx,Allergies, and Family Hx were reviewed.     Patient was given the following medications:  Orders Placed This Encounter   Medications    methocarbamol (ROBAXIN) tablet 1,000 mg    ketorolac (TORADOL) injection 15 mg    DISCONTD: lidocaine 4 % external patch 1 patch    methocarbamol (ROBAXIN) 500 MG tablet     Sig: Take 2 tablets by mouth 3 times daily for 10 days     Dispense:  60 tablet     Refill:  0    naproxen (NAPROSYN) 500 MG tablet     Sig: Take 1 tablet by mouth 2 times daily as needed for Pain     Dispense:  30 tablet     Refill:  0       Diagnostic Results     EKG   Interpreted in conjunction with emergency department physician Josh Nicolas MD  Rhythm: normal sinus   Rate: normal  Axis: normal  Ectopy: none  Conduction: normal  ST Segments: normal  T Waves: normal  Q Waves:none  Clinical Impression: Sinus rhythm, no ectopy, normal QTC, no evidence of ischemia, injury or infarct  Comparison: No prior ECG for comparison    ED BEDSIDE ULTRASOUND:  N/A    RECENT VITALS:  BP: 115/77,Temp: 98 °F (36.7 °C), Pulse: 74, Resp: 16, SpO2: 100 %     RADIOLOGY:  No orders to display       LABS:   Results for orders placed or performed during the hospital encounter of 12/02/21   EKG 12 Lead   Result Value Ref Range    Ventricular Rate 77 BPM    Atrial Rate 77 BPM    P-R Interval 164 ms    QRS Duration 96 ms    Q-T Interval 400 ms    QTc Calculation (Bazett) 452 ms    P Axis 70 degrees R Axis 21 degrees    T Axis 57 degrees    Diagnosis       EKG performed in ER and to be interpreted by ER physician. Confirmed by MD, ER ([id number]),  Kika Mercado ([id number]) on 12/4/2021 7:35:19 AM       CONSULTS:  None    PATIENT REFERRED TO:  Miki Keller MD  [address]  [address]  [phone]    Schedule an appointment as soon as possible for a visit       [facility] Emergency Department  [address] [place] [address]  [phone]  Go to   If symptoms worsen      DISCHARGE MEDICATIONS:  Discharge Medication List as of 12/2/2021 11:53 PM      START taking these medications    Details   methocarbamol (ROBAXIN) 500 MG tablet Take 2 tablets by mouth 3 times daily for 10 days, Disp-60 tablet, R-0Print      naproxen (NAPROSYN) 500 MG tablet Take 1 tablet by mouth 2 times daily as needed for Pain, Disp-30 tablet, R-0Print                [address], [place]  12/13/21 6908